# Patient Record
Sex: MALE | URBAN - METROPOLITAN AREA
[De-identification: names, ages, dates, MRNs, and addresses within clinical notes are randomized per-mention and may not be internally consistent; named-entity substitution may affect disease eponyms.]

---

## 2017-04-10 ENCOUNTER — TELEPHONE (OUTPATIENT)
Dept: SPORTS MEDICINE | Facility: CLINIC | Age: 37
End: 2017-04-10

## 2017-04-10 NOTE — TELEPHONE ENCOUNTER
----- Message from Ponce Mayes sent at 4/10/2017  9:36 AM CDT -----  Contact: self  Pt injured his rt knee and would like to be seen today. Mr. Mobley can be reached at [phone].

## 2017-04-11 ENCOUNTER — OFFICE VISIT (OUTPATIENT)
Dept: INTERNAL MEDICINE | Facility: CLINIC | Age: 37
End: 2017-04-11
Payer: COMMERCIAL

## 2017-04-11 VITALS
WEIGHT: 201.94 LBS | OXYGEN SATURATION: 98 % | SYSTOLIC BLOOD PRESSURE: 118 MMHG | BODY MASS INDEX: 29.91 KG/M2 | HEART RATE: 65 BPM | HEIGHT: 69 IN | DIASTOLIC BLOOD PRESSURE: 62 MMHG

## 2017-04-11 DIAGNOSIS — M22.2X1 PATELLOFEMORAL PAIN SYNDROME OF RIGHT KNEE: Primary | ICD-10-CM

## 2017-04-11 PROCEDURE — 99999 PR PBB SHADOW E&M-EST. PATIENT-LVL III: CPT | Mod: PBBFAC,,, | Performed by: FAMILY MEDICINE

## 2017-04-11 PROCEDURE — 1160F RVW MEDS BY RX/DR IN RCRD: CPT | Mod: S$GLB,,, | Performed by: FAMILY MEDICINE

## 2017-04-11 PROCEDURE — 99203 OFFICE O/P NEW LOW 30 MIN: CPT | Mod: S$GLB,,, | Performed by: FAMILY MEDICINE

## 2017-04-11 RX ORDER — IBUPROFEN 200 MG
200 TABLET ORAL EVERY 6 HOURS PRN
COMMUNITY
End: 2018-03-06

## 2017-04-11 NOTE — MR AVS SNAPSHOT
"    [facility]  [address]  [facility] [address]  Phone: [phone]  Fax: [phone]                  Urbano Paz   2017 3:40 PM   Office Visit    Description:  Male : 1980   Provider:  Oli Jerome MD   Department:  [facility]           Reason for Visit     Knee Pain           Diagnoses this Visit        Comments    Patellofemoral pain syndrome of right knee    -  Primary            To Do List           Goals (5 Years of Data)     None      Follow-Up and Disposition     Return for Call or message through Portal for status update..      [facility]     [facility] On Call Nurse Care Line -  Assistance  Unless otherwise directed by your provider, please contact [facility], our nurse care line that is available for  assistance.     Registered nurses in the [facility] On Call Center provide: appointment scheduling, clinical advisement, health education, and other advisory services.  Call: [phone] (toll free)               Medications           Message regarding Medications     Verify the changes and/or additions to your medication regime listed below are the same as discussed with your clinician today.  If any of these changes or additions are incorrect, please notify your healthcare provider.             Verify that the below list of medications is an accurate representation of the medications you are currently taking.  If none reported, the list may be blank. If incorrect, please contact your healthcare provider. Carry this list with you in case of emergency.           Current Medications     ibuprofen (ADVIL,MOTRIN) 200 MG tablet Take 200 mg by mouth every 6 (six) hours as needed for Pain.           Clinical Reference Information           Your Vitals Were     BP Pulse Height Weight SpO2 BMI    118/62 (BP Location: Left arm, Patient Position: Sitting, BP Method: Manual) 65 5' 9" (1.753 m) 91.6 kg (201 lb 15.1 oz) " 98% 29.82 kg/m2      Blood Pressure          Most Recent Value    BP  118/62      Allergies as of 4/11/2017     No Known Allergies      Immunizations Administered on Date of Encounter - 4/11/2017     None      [facility] Sign-Up     Activating your [facility] account is as easy as 1-2-3!     1) Visit [email or web address], select Sign Up Now, enter this activation code and your date of birth, then select Next.  [email or web address]  Expires: 5/26/2017  3:58 PM      2) Create a username and password to use when you visit [facility] in the future and select a security question in case you lose your password and select Next.    3) Enter your e-mail address and click Sign Up!    Additional Information  If you have questions, please e-mail [email or web address] or call [phone] to talk to our [facility] staff. Remember, [facility] is NOT to be used for urgent needs. For medical emergencies, dial 911.         Language Assistance Services     ATTENTION: Language assistance services are available, free of charge. Please call [phone].      ATENCIÓN: Si habla español, tiene a bojorquez disposición servicios gratuitos de asistencia lingüística. Llame al [phone].     CHARLEY Ý: N?u b?n nói Ti?ng Vi?t, có các d?ch v? h? tr? ngôn ng? mi?n phí dành cho b?n. G?i s? [phone].         [facility] complies with applicable Federal civil rights laws and does not discriminate on the basis of race, color, national origin, age, disability, or sex.

## 2017-04-11 NOTE — PROGRESS NOTES
Subjective:       Patient ID: Urbano Paz is a 36 y.o. male.    Chief Complaint: Knee Pain (Right knee x Sunday )    HPI  Review of Systems   Constitutional: Negative for chills, fatigue and fever.   HENT: Negative for congestion and trouble swallowing.    Eyes: Negative for redness.   Respiratory: Negative for cough, chest tightness and shortness of breath.    Cardiovascular: Negative for chest pain, palpitations and leg swelling.   Gastrointestinal: Negative for abdominal pain and blood in stool.   Genitourinary: Negative for hematuria.   Musculoskeletal: Negative for arthralgias, back pain, gait problem, joint swelling, myalgias and neck pain.   Skin: Negative for color change and rash.   Neurological: Negative for tremors, speech difficulty, weakness, numbness and headaches.   Hematological: Negative for adenopathy. Does not bruise/bleed easily.   Psychiatric/Behavioral: Negative for behavioral problems, confusion and sleep disturbance. The patient is not nervous/anxious.        Objective:      Physical Exam   Constitutional: He is oriented to person, place, and time. He appears well-developed and well-nourished. No distress.   Neck: Neck supple.   Pulmonary/Chest: Effort normal.   Musculoskeletal: He exhibits no edema.        Right knee: He exhibits normal range of motion, no swelling, no effusion, no ecchymosis, no deformity, no laceration, no erythema, normal alignment, no LCL laxity, normal patellar mobility, no bony tenderness, normal meniscus and no MCL laxity. Tenderness found. No medial joint line, no lateral joint line, no MCL, no LCL and no patellar tendon tenderness noted.        Left knee: He exhibits normal range of motion, no swelling, no effusion, no ecchymosis, no deformity, no laceration, no erythema, normal alignment, no LCL laxity, normal patellar mobility, no bony tenderness, normal meniscus and no MCL laxity. No tenderness found. No medial joint line, no lateral joint line, no  MCL, no LCL and no patellar tendon tenderness noted.        Right lower leg: He exhibits no edema.        Left lower leg: He exhibits no edema.   Neurological: He is alert and oriented to person, place, and time. He has normal strength. No sensory deficit. Coordination and gait normal.   Skin: Skin is warm and dry. No rash noted.   Psychiatric: He has a normal mood and affect. His behavior is normal. Judgment and thought content normal.   Nursing note and vitals reviewed.      Assessment:       1. Patellofemoral pain syndrome of right knee        Plan:   Urbano was seen today for knee pain.    Diagnoses and all orders for this visit:    Patellofemoral pain syndrome of right knee      See meds, orders, follow up, routing and instructions sections of encounter.  HISTORY OF PRESENT ILLNESS:  This is a gentleman with a right knee injury,   unspecified from doing martial arts a few days ago.  There was no specific   injury.  His pain initiated essentially after a workout.  It is located to the   lower lateral patellar facet on the right at the juncture of the patellar   tendon.    He has an essentially normal knee examination, but did have difficulty   descending the step with an eccentric contraction on the ipsilateral side.    Suggested stretching, Aleve, and a followup p.r.n. for nonresolution or   worsening.  He has a karate tournament next week.  I asked him to do progressive   activities as a test through the week and notify me for significant problems.      DEMETRIO/LISSETH  dd: 04/11/2017 17:19:41 (CDT)  td: 04/12/2017 14:32:41 (CDT)  Doc ID   #[id number]  Job ID #[id number]    CC:

## 2018-03-06 ENCOUNTER — HOSPITAL ENCOUNTER (EMERGENCY)
Facility: HOSPITAL | Age: 38
Discharge: HOME | End: 2018-03-06
Attending: EMERGENCY MEDICINE
Payer: COMMERCIAL

## 2018-03-06 ENCOUNTER — OFFICE VISIT (OUTPATIENT)
Dept: INTERNAL MEDICINE | Facility: CLINIC | Age: 38
End: 2018-03-06
Payer: COMMERCIAL

## 2018-03-06 VITALS
SYSTOLIC BLOOD PRESSURE: 118 MMHG | HEIGHT: 69 IN | WEIGHT: 200.81 LBS | HEART RATE: 50 BPM | DIASTOLIC BLOOD PRESSURE: 72 MMHG | OXYGEN SATURATION: 98 % | BODY MASS INDEX: 29.74 KG/M2

## 2018-03-06 VITALS
DIASTOLIC BLOOD PRESSURE: 59 MMHG | BODY MASS INDEX: 28.88 KG/M2 | TEMPERATURE: 99 F | OXYGEN SATURATION: 98 % | HEART RATE: 50 BPM | HEIGHT: 69 IN | WEIGHT: 195 LBS | SYSTOLIC BLOOD PRESSURE: 114 MMHG | RESPIRATION RATE: 10 BRPM

## 2018-03-06 DIAGNOSIS — R07.9 CHEST PAIN, UNSPECIFIED TYPE: Primary | ICD-10-CM

## 2018-03-06 DIAGNOSIS — R07.9 CHEST PAIN: ICD-10-CM

## 2018-03-06 DIAGNOSIS — G80.9 CP (CEREBRAL PALSY): ICD-10-CM

## 2018-03-06 LAB
ALBUMIN SERPL BCP-MCNC: 4.5 G/DL
ALP SERPL-CCNC: 54 U/L
ALT SERPL W/O P-5'-P-CCNC: 44 U/L
ANION GAP SERPL CALC-SCNC: 9 MMOL/L
AST SERPL-CCNC: 25 U/L
BASOPHILS # BLD AUTO: 0.02 K/UL
BASOPHILS NFR BLD: 0.5 %
BILIRUB SERPL-MCNC: 0.7 MG/DL
BNP SERPL-MCNC: 29 PG/ML
BUN SERPL-MCNC: 15 MG/DL
CALCIUM SERPL-MCNC: 9.7 MG/DL
CHLORIDE SERPL-SCNC: 105 MMOL/L
CO2 SERPL-SCNC: 26 MMOL/L
CREAT SERPL-MCNC: 1.2 MG/DL
DIFFERENTIAL METHOD: ABNORMAL
EOSINOPHIL # BLD AUTO: 0.1 K/UL
EOSINOPHIL NFR BLD: 1.5 %
ERYTHROCYTE [DISTWIDTH] IN BLOOD BY AUTOMATED COUNT: 13.7 %
EST. GFR  (AFRICAN AMERICAN): >60 ML/MIN/1.73 M^2
EST. GFR  (NON AFRICAN AMERICAN): >60 ML/MIN/1.73 M^2
GLUCOSE SERPL-MCNC: 103 MG/DL
HCT VFR BLD AUTO: 38 %
HGB BLD-MCNC: 12.4 G/DL
IMM GRANULOCYTES # BLD AUTO: 0.01 K/UL
IMM GRANULOCYTES NFR BLD AUTO: 0.3 %
LYMPHOCYTES # BLD AUTO: 1.7 K/UL
LYMPHOCYTES NFR BLD: 43.7 %
MCH RBC QN AUTO: 27.4 PG
MCHC RBC AUTO-ENTMCNC: 32.6 G/DL
MCV RBC AUTO: 84 FL
MONOCYTES # BLD AUTO: 0.3 K/UL
MONOCYTES NFR BLD: 8.1 %
NEUTROPHILS # BLD AUTO: 1.8 K/UL
NEUTROPHILS NFR BLD: 45.9 %
NRBC BLD-RTO: 0 /100 WBC
PLATELET # BLD AUTO: 196 K/UL
PMV BLD AUTO: 12.1 FL
POTASSIUM SERPL-SCNC: 4.1 MMOL/L
PROT SERPL-MCNC: 7.4 G/DL
RBC # BLD AUTO: 4.53 M/UL
SODIUM SERPL-SCNC: 140 MMOL/L
TROPONIN I SERPL DL<=0.01 NG/ML-MCNC: <0.006 NG/ML
WBC # BLD AUTO: 3.94 K/UL

## 2018-03-06 PROCEDURE — 93005 ELECTROCARDIOGRAM TRACING: CPT

## 2018-03-06 PROCEDURE — 93010 ELECTROCARDIOGRAM REPORT: CPT | Mod: S$GLB,,, | Performed by: INTERNAL MEDICINE

## 2018-03-06 PROCEDURE — 99284 EMERGENCY DEPT VISIT MOD MDM: CPT | Mod: 25

## 2018-03-06 PROCEDURE — 85025 COMPLETE CBC W/AUTO DIFF WBC: CPT

## 2018-03-06 PROCEDURE — 93010 ELECTROCARDIOGRAM REPORT: CPT | Mod: ,,, | Performed by: INTERNAL MEDICINE

## 2018-03-06 PROCEDURE — 25000003 PHARM REV CODE 250: Performed by: EMERGENCY MEDICINE

## 2018-03-06 PROCEDURE — 99213 OFFICE O/P EST LOW 20 MIN: CPT | Mod: S$GLB,,, | Performed by: PHYSICIAN ASSISTANT

## 2018-03-06 PROCEDURE — 99999 PR PBB SHADOW E&M-EST. PATIENT-LVL III: CPT | Mod: PBBFAC,,, | Performed by: PHYSICIAN ASSISTANT

## 2018-03-06 PROCEDURE — 83880 ASSAY OF NATRIURETIC PEPTIDE: CPT

## 2018-03-06 PROCEDURE — 84484 ASSAY OF TROPONIN QUANT: CPT

## 2018-03-06 PROCEDURE — 93005 ELECTROCARDIOGRAM TRACING: CPT | Mod: S$GLB,,, | Performed by: PHYSICIAN ASSISTANT

## 2018-03-06 PROCEDURE — 99285 EMERGENCY DEPT VISIT HI MDM: CPT | Mod: ,,, | Performed by: EMERGENCY MEDICINE

## 2018-03-06 PROCEDURE — 80053 COMPREHEN METABOLIC PANEL: CPT

## 2018-03-06 RX ORDER — ASPIRIN 325 MG
325 TABLET ORAL
Status: COMPLETED | OUTPATIENT
Start: 2018-03-06 | End: 2018-03-06

## 2018-03-06 RX ORDER — NAPROXEN SODIUM 220 MG/1
324 TABLET, FILM COATED ORAL
Status: DISCONTINUED | OUTPATIENT
Start: 2018-03-06 | End: 2018-03-06

## 2018-03-06 RX ORDER — KETOROLAC TROMETHAMINE 30 MG/ML
30 INJECTION, SOLUTION INTRAMUSCULAR; INTRAVENOUS
Status: DISCONTINUED | OUTPATIENT
Start: 2018-03-06 | End: 2018-03-06 | Stop reason: HOSPADM

## 2018-03-06 RX ORDER — IBUPROFEN 800 MG/1
800 TABLET ORAL EVERY 6 HOURS PRN
Qty: 20 TABLET | Refills: 0 | Status: SHIPPED | OUTPATIENT
Start: 2018-03-06 | End: 2020-06-17

## 2018-03-06 RX ADMIN — ASPIRIN 325 MG ORAL TABLET 325 MG: 325 PILL ORAL at 01:03

## 2018-03-06 NOTE — ED NOTES
Patient refused 2nd troponin draw and pain medication.  Dr. Reed notified.  Patient will be discharged

## 2018-03-06 NOTE — PROGRESS NOTES
Subjective:       Patient ID: Urbano Paz is a 37 y.o. male.        Chief Complaint: Chest Pain    Urbano Paz is an established patient of [facility] here today for urgent care visit.    Substernal chest pain radiating down left arm with associated with left arm tingling/numbness.  Initially had chest pain last night.  Woke up from sleeping with a pounding feeling in his chest and associated shortness of breath.  He changed position to lay on his back and his wife gave him an antacid.  The pounding and shortness of breath improved after 5-10 minutes.  He went back to sleep.    This morning, while walking into school and carrying a back pain and other items in his arms, he began to have chest pain again.  The pain began to radiate down his left arm and with associated tingling/numbness.  He feels fatigued.    He has not had a physical in a while.             Review of Systems   Constitutional: Positive for fatigue. Negative for appetite change, chills and fever.   HENT: Negative for congestion and sore throat.    Eyes: Negative for visual disturbance.   Respiratory: Negative for cough, chest tightness and shortness of breath.    Cardiovascular: Positive for chest pain. Negative for palpitations and leg swelling.   Gastrointestinal: Negative for abdominal pain, blood in stool, constipation, diarrhea, nausea and vomiting.   Genitourinary: Negative for dysuria, frequency, hematuria and urgency.   Musculoskeletal: Negative for arthralgias and back pain.   Skin: Negative for rash.   Neurological: Positive for numbness. Negative for dizziness, syncope, weakness and headaches.   Psychiatric/Behavioral: Negative for dysphoric mood and sleep disturbance. The patient is not nervous/anxious.        Objective:      Physical Exam   Constitutional: He appears well-developed and well-nourished. No distress.   HENT:   Head: Normocephalic and atraumatic.   Right Ear: Tympanic membrane, external ear and  "ear canal normal.   Left Ear: Tympanic membrane, external ear and ear canal normal.   Nose: Nose normal.   Mouth/Throat: Oropharynx is clear and moist.   Eyes: Conjunctivae are normal. Pupils are equal, round, and reactive to light.   Cardiovascular: Normal rate, regular rhythm and normal heart sounds.  Exam reveals no gallop and no friction rub.    No murmur heard.  Pulmonary/Chest: Effort normal and breath sounds normal. No respiratory distress.   Abdominal: Soft. There is no tenderness. There is no rebound and no guarding.   Musculoskeletal: He exhibits no edema.   Neurological: He is alert.   Skin: Skin is warm and dry. He is not diaphoretic.   Psychiatric: He has a normal mood and affect.   Nursing note and vitals reviewed.      Assessment:       1. Chest pain, unspecified type        Plan:       Urbano was seen today for chest pain.    Diagnoses and all orders for this visit:    Chest pain, unspecified type  -     EKG 12-lead  -     Refer to Emergency Dept.    Patient discussed with Dr. Tucker today in detail.  Exertional chest pain radiating down left arm with associated numbness/tingling.  Needs further eval in ED.  His wife will transport him to the ED.     Pt has been given instructions populated from [employer] database and has verbalized understanding of the after visit summary and information contained wherein.    Follow up with a primary care provider. May go to ER for acute shortness of breath, lightheadedness, fever, or any other emergent complaints or changes in condition.    "This note will be shared with the patient"    No future appointments.            "

## 2018-03-06 NOTE — ED NOTES
Side rails up x 2, call bell in reach, family member at bedside.  Patient denies any other needs at this time.  Will continue to monitor.

## 2018-03-06 NOTE — ED PROVIDER NOTES
Encounter Date: 3/6/2018    SCRIBE #1 NOTE: I, Billie Feliciano, am scribing for, and in the presence of,  Dr. Reed . I have scribed the entire note.       History     Chief Complaint   Patient presents with    Chest Pain     sent im clinic, chest pain at 3 am, denies cardiac hx     Time patient was seen by the provider: 3:29 PM    The patient is a 37 y.o. male with no co-morbidities who presents to the ED with a complaint of chest pain with associated SOB. At 3:00AM pt woke up with substernal chest pressure for approximately 10 minutes with associated SOB. The symptoms resolved. He did not experience any other episodes of chest pain,but states while he was carrying boxes, he experienced left shoulder numbness and tingling. He denies chest pain, and SOB currently. He also denies recent travel, fever, chills, and cough.        The history is provided by the patient.     Review of patient's allergies indicates:  No Known Allergies  History reviewed. No pertinent past medical history.  Past Surgical History:   Procedure Laterality Date    ADENOIDECTOMY       History reviewed. No pertinent family history.  Social History   Substance Use Topics    Smoking status: Former Smoker    Smokeless tobacco: Not on file    Alcohol use Yes      Comment: socially     Review of Systems   Constitutional: Negative for chills and fever.   HENT: Negative for sore throat.    Respiratory: Positive for shortness of breath (Resolved ). Negative for cough.    Cardiovascular: Positive for chest pain (Resolved).   Gastrointestinal: Negative for nausea.   Genitourinary: Negative for dysuria.   Musculoskeletal: Negative for back pain.   Skin: Negative for rash.   Neurological: Positive for numbness (Left shoulder ). Negative for weakness.        Positive for tingling in left shoulder.    Hematological: Does not bruise/bleed easily.       Physical Exam     Initial Vitals [03/06/18 1302]   BP Pulse Resp Temp SpO2   (!) 119/59 (!) 53 16 98.5 °F  (36.9 °C) 98 %      MAP       79         Physical Exam    Nursing note and vitals reviewed.  Constitutional: He appears well-developed and well-nourished.   HENT:   Head: Normocephalic and atraumatic.   Eyes: Conjunctivae and EOM are normal. Pupils are equal, round, and reactive to light.   Neck: Normal range of motion. Neck supple. No tracheal deviation present.   Cardiovascular: Normal heart sounds and intact distal pulses.   Mild Bradycardia.    Pulmonary/Chest: Breath sounds normal. No respiratory distress. He has no wheezes. He has no rhonchi. He has no rales. He exhibits no tenderness.   Abdominal: Soft. Bowel sounds are normal. He exhibits no distension and no mass. There is no tenderness. There is no rebound and no guarding.   Musculoskeletal: Normal range of motion. He exhibits no edema.   Neurological: He is alert and oriented to person, place, and time. He has normal strength. No sensory deficit.   Skin: Skin is warm and dry. Capillary refill takes less than 2 seconds.   Psychiatric: He has a normal mood and affect. His behavior is normal. Judgment and thought content normal.           ED Course   Procedures  Labs Reviewed   CBC W/ AUTO DIFFERENTIAL - Abnormal; Notable for the following:        Result Value    RBC 4.53 (*)     Hemoglobin 12.4 (*)     Hematocrit 38.0 (*)     All other components within normal limits   COMPREHENSIVE METABOLIC PANEL - Abnormal; Notable for the following:     Alkaline Phosphatase 54 (*)     All other components within normal limits   TROPONIN I   B-TYPE NATRIURETIC PEPTIDE   TROPONIN I        Imaging Results          X-Ray Chest AP Portable (Final result)  Result time 03/06/18 14:48:32    Final result by Herman Doss MD (03/06/18 14:48:32)                 Impression:      No acute cardiopulmonary process.        Electronically signed by: HERMAN DOSS MD  Date:     03/06/18  Time:    14:48              Narrative:    Chest AP portable    Indication:Cerebral  palsy    Comparison:None    Findings:  The cardiomediastinal silhouette is not enlarged.  There is no pleural effusion.  The trachea is midline.  The lungs are symmetrically expanded bilaterally without evidence of acute parenchymal process. No large focal consolidation seen.  There is no pneumothorax.  The osseous structures are unremarkable.                                 Medical Decision Making:   History:   Old Medical Records: I decided to obtain old medical records.  Clinical Tests:   Lab Tests: Ordered and Reviewed  Radiological Study: Ordered and Reviewed  Medical Tests: Ordered and Reviewed  ED Management:  The patient's EKG showed no signs of ischemia, but did show bradycardia.  The patient admits that he is always had a slow heart rate.  The patient's chest x-ray was negative for any acute pathology, neurological grossly within normal limits.  Specifically the patient's troponin was negative, making the patient's incident of chest discomfort 2 AM very unlikely secondary to myocardial ischemia.  I did offer to repeat a second set of cardiac enzymes, and also offered observation admission the patient, they stated that he would prefer to be discharged home, but also stated that he would be following up with cardiology for further assessment and evaluation.  The patient verbalizes understanding of this plan and agreed.  Stated he would return immediately for any recurrent episodes.            Scribe Attestation:   Scribe #1: I performed the above scribed service and the documentation accurately describes the services I performed. I attest to the accuracy of the note.    I, Dr. Pelon Reed, personally performed the services described in this documentation. All medical record entries made by the scribe were at my direction and in my presence.  I have reviewed the chart and agree that the record reflects my personal performance and is accurate and complete.  Pelon Reed MD.  3:49 PM 03/06/2018               Clinical  Impression:   The primary encounter diagnosis was Chest pain, unspecified type. Diagnoses of Chest pain and CP (cerebral palsy) were also pertinent to this visit.                           Massimo Reed MD  03/06/18 2445

## 2018-03-06 NOTE — ED NOTES
"Chest pain that started around 0300 this morning that woke him from his sleep.  Describes pain as "heart racing and strong/dull pain".  States pain was right in center of his chest.  reports SOB with the pain and nausea.  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   "

## 2018-06-28 ENCOUNTER — OFFICE VISIT (OUTPATIENT)
Dept: INTERNAL MEDICINE | Facility: CLINIC | Age: 38
End: 2018-06-28
Attending: FAMILY MEDICINE
Payer: COMMERCIAL

## 2018-06-28 ENCOUNTER — HOSPITAL ENCOUNTER (OUTPATIENT)
Dept: RADIOLOGY | Facility: HOSPITAL | Age: 38
Discharge: HOME | End: 2018-06-28
Attending: FAMILY MEDICINE
Payer: COMMERCIAL

## 2018-06-28 VITALS
DIASTOLIC BLOOD PRESSURE: 60 MMHG | WEIGHT: 199.31 LBS | SYSTOLIC BLOOD PRESSURE: 100 MMHG | HEIGHT: 69 IN | HEART RATE: 56 BPM | BODY MASS INDEX: 29.52 KG/M2

## 2018-06-28 DIAGNOSIS — R10.31 RIGHT INGUINAL PAIN: ICD-10-CM

## 2018-06-28 DIAGNOSIS — M22.2X1 PATELLOFEMORAL PAIN SYNDROME OF RIGHT KNEE: ICD-10-CM

## 2018-06-28 DIAGNOSIS — Z00.00 ANNUAL PHYSICAL EXAM: ICD-10-CM

## 2018-06-28 DIAGNOSIS — R07.9 CHEST PAIN, UNSPECIFIED TYPE: ICD-10-CM

## 2018-06-28 DIAGNOSIS — M22.2X1 PATELLOFEMORAL PAIN SYNDROME OF RIGHT KNEE: Primary | ICD-10-CM

## 2018-06-28 PROCEDURE — 73564 X-RAY EXAM KNEE 4 OR MORE: CPT | Mod: 26,RT,, | Performed by: RADIOLOGY

## 2018-06-28 PROCEDURE — 73521 X-RAY EXAM HIPS BI 2 VIEWS: CPT | Mod: 26,,, | Performed by: RADIOLOGY

## 2018-06-28 PROCEDURE — 73521 X-RAY EXAM HIPS BI 2 VIEWS: CPT | Mod: TC

## 2018-06-28 PROCEDURE — 73562 X-RAY EXAM OF KNEE 3: CPT | Mod: 26,XS,LT, | Performed by: RADIOLOGY

## 2018-06-28 PROCEDURE — 73562 X-RAY EXAM OF KNEE 3: CPT | Mod: TC,LT

## 2018-06-28 PROCEDURE — 99214 OFFICE O/P EST MOD 30 MIN: CPT | Mod: S$GLB,,, | Performed by: FAMILY MEDICINE

## 2018-06-28 PROCEDURE — 99999 PR PBB SHADOW E&M-EST. PATIENT-LVL IV: CPT | Mod: PBBFAC,,, | Performed by: FAMILY MEDICINE

## 2018-06-28 PROCEDURE — 3008F BODY MASS INDEX DOCD: CPT | Mod: CPTII,S$GLB,, | Performed by: FAMILY MEDICINE
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
## 2018-07-03 ENCOUNTER — TELEPHONE (OUTPATIENT)
Dept: INTERNAL MEDICINE | Facility: CLINIC | Age: 38
End: 2018-07-03

## 2019-07-29 ENCOUNTER — LAB VISIT (OUTPATIENT)
Dept: LAB | Facility: HOSPITAL | Age: 39
End: 2019-07-29
Attending: INTERNAL MEDICINE
Payer: COMMERCIAL

## 2019-07-29 ENCOUNTER — OFFICE VISIT (OUTPATIENT)
Dept: INTERNAL MEDICINE | Facility: CLINIC | Age: 39
End: 2019-07-29
Payer: COMMERCIAL

## 2019-07-29 VITALS
HEIGHT: 68 IN | BODY MASS INDEX: 29.07 KG/M2 | OXYGEN SATURATION: 58 % | DIASTOLIC BLOOD PRESSURE: 72 MMHG | SYSTOLIC BLOOD PRESSURE: 106 MMHG | HEART RATE: 70 BPM | WEIGHT: 191.81 LBS

## 2019-07-29 DIAGNOSIS — D64.9 ANEMIA, UNSPECIFIED TYPE: ICD-10-CM

## 2019-07-29 DIAGNOSIS — R10.13 EPIGASTRIC PAIN: Primary | ICD-10-CM

## 2019-07-29 DIAGNOSIS — R10.13 EPIGASTRIC PAIN: ICD-10-CM

## 2019-07-29 LAB
ALBUMIN SERPL BCP-MCNC: 4.2 G/DL (ref 3.5–5.2)
ALP SERPL-CCNC: 41 U/L (ref 55–135)
ALT SERPL W/O P-5'-P-CCNC: 48 U/L (ref 10–44)
ANION GAP SERPL CALC-SCNC: 8 MMOL/L (ref 8–16)
AST SERPL-CCNC: 23 U/L (ref 10–40)
BILIRUB SERPL-MCNC: 0.2 MG/DL (ref 0.1–1)
BUN SERPL-MCNC: 16 MG/DL (ref 6–20)
CALCIUM SERPL-MCNC: 9.4 MG/DL (ref 8.7–10.5)
CHLORIDE SERPL-SCNC: 104 MMOL/L (ref 95–110)
CO2 SERPL-SCNC: 30 MMOL/L (ref 23–29)
CREAT SERPL-MCNC: 1.1 MG/DL (ref 0.5–1.4)
DIFFERENTIAL METHOD: ABNORMAL
ERYTHROCYTE [DISTWIDTH] IN BLOOD BY AUTOMATED COUNT: 13.8 % (ref 11.5–14.5)
EST. GFR  (AFRICAN AMERICAN): >60 ML/MIN/1.73 M^2
EST. GFR  (NON AFRICAN AMERICAN): >60 ML/MIN/1.73 M^2
GLUCOSE SERPL-MCNC: 95 MG/DL (ref 70–110)
HCT VFR BLD AUTO: 39.6 % (ref 40–54)
HGB BLD-MCNC: 12.8 G/DL (ref 14–18)
MCH RBC QN AUTO: 27.9 PG (ref 27–31)
MCHC RBC AUTO-ENTMCNC: 32.3 G/DL (ref 32–36)
MCV RBC AUTO: 87 FL (ref 82–98)
PLATELET # BLD AUTO: 232 K/UL (ref 150–350)
PMV BLD AUTO: 11.5 FL (ref 9.2–12.9)
POTASSIUM SERPL-SCNC: 4 MMOL/L (ref 3.5–5.1)
PROT SERPL-MCNC: 7.1 G/DL (ref 6–8.4)
RBC # BLD AUTO: 4.58 M/UL (ref 4.6–6.2)
SODIUM SERPL-SCNC: 142 MMOL/L (ref 136–145)
WBC # BLD AUTO: 4.59 K/UL (ref 3.9–12.7)

## 2019-07-29 PROCEDURE — 82728 ASSAY OF FERRITIN: CPT

## 2019-07-29 PROCEDURE — 99999 PR PBB SHADOW E&M-EST. PATIENT-LVL III: ICD-10-PCS | Mod: PBBFAC,,, | Performed by: INTERNAL MEDICINE

## 2019-07-29 PROCEDURE — 83020 HEMOGLOBIN ELECTROPHORESIS: CPT

## 2019-07-29 PROCEDURE — 99214 OFFICE O/P EST MOD 30 MIN: CPT | Mod: S$GLB,,, | Performed by: INTERNAL MEDICINE

## 2019-07-29 PROCEDURE — 3008F PR BODY MASS INDEX (BMI) DOCUMENTED: ICD-10-PCS | Mod: CPTII,S$GLB,, | Performed by: INTERNAL MEDICINE

## 2019-07-29 PROCEDURE — 99999 PR PBB SHADOW E&M-EST. PATIENT-LVL III: CPT | Mod: PBBFAC,,, | Performed by: INTERNAL MEDICINE

## 2019-07-29 PROCEDURE — 86677 HELICOBACTER PYLORI ANTIBODY: CPT

## 2019-07-29 PROCEDURE — 3008F BODY MASS INDEX DOCD: CPT | Mod: CPTII,S$GLB,, | Performed by: INTERNAL MEDICINE

## 2019-07-29 PROCEDURE — 83540 ASSAY OF IRON: CPT

## 2019-07-29 PROCEDURE — 36415 COLL VENOUS BLD VENIPUNCTURE: CPT

## 2019-07-29 PROCEDURE — 99214 PR OFFICE/OUTPT VISIT, EST, LEVL IV, 30-39 MIN: ICD-10-PCS | Mod: S$GLB,,, | Performed by: INTERNAL MEDICINE

## 2019-07-29 PROCEDURE — 80053 COMPREHEN METABOLIC PANEL: CPT

## 2019-07-29 NOTE — PROGRESS NOTES
Subjective:       Patient ID: Urbano Art is a 38 y.o. male.    Chief Complaint: Follow-up    HPI   It [place] 3 weeks. Returned 7/19.  [place] gasy on way home.  Tuesday last week, sweats, upper abd pain chills.  Felt cold and hot.  Didn't take temp.    Better after a couple of days.  Feels better now - but still doesn' t feel right. Burning in upper abd, now dull discomfort.    Recalls eating poorly cooked meat and wondes if he might have tapeworms.   C/o nausea.  No vomiting.  Dizzy in waves .    Fatigued.  Now eating normally.  But sometimes can't finish meals.  Upper abd pain may wax and wane in intensity.  Sleeps normally at night.  Lost a couple of lbs initially, stable since.      Last labs reviewed. NCNC anemia.  Review of Systems   Constitutional: Negative for activity change and unexpected weight change.   Respiratory: Negative for chest tightness and shortness of breath.    Cardiovascular: Negative for chest pain and leg swelling.   Gastrointestinal: Positive for abdominal pain and nausea.   Neurological: Negative for headaches.   Psychiatric/Behavioral: Negative for dysphoric mood.       Objective:      Physical Exam   Constitutional: He is oriented to person, place, and time. He appears well-developed and well-nourished.   Eyes: No scleral icterus.   Neck: No JVD present. No thyromegaly present.   Cardiovascular: Normal rate, regular rhythm and normal heart sounds.   Pulmonary/Chest: Effort normal and breath sounds normal. No respiratory distress. He has no wheezes. He has no rales.   Abdominal: Soft. He exhibits no mass. There is no tenderness.   Musculoskeletal: He exhibits no edema.   Neurological: He is alert and oriented to person, place, and time.   Psychiatric: He has a normal mood and affect. His behavior is normal.       Assessment:       1. Epigastric pain - improving.  Gastritis vs other.   2. Anemia, unspecified type        Plan:       Urbano was seen today for  follow-up.    Diagnoses and all orders for this visit:    Epigastric pain  -     CBC auto differential; Future  -     Comprehensive metabolic panel; Future  -     Hemoglobin Electrophoresis [place], Blood; Future  -     H.Pylori Antibody IgG; Future    Anemia, unspecified type  -     Ferritin; Future  -     Iron and TIBC; Future       Reassurance      Zantac      He declines med for nausea      Call if im provement doesn't continue.

## 2019-07-30 LAB
FERRITIN SERPL-MCNC: 108 NG/ML (ref 20–300)
IRON SERPL-MCNC: 83 UG/DL (ref 45–160)
SATURATED IRON: 25 % (ref 20–50)
TOTAL IRON BINDING CAPACITY: 326 UG/DL (ref 250–450)
TRANSFERRIN SERPL-MCNC: 220 MG/DL (ref 200–375)

## 2019-07-31 LAB
HGB A MFR BLD ELPH: 97.5 % (ref 95.8–98)
HGB A2 MFR BLD: 2.5 % (ref 2–3.3)
HGB F MFR BLD: 0 % (ref 0–0.9)
HGB FRACT BLD ELPH-IMP: NORMAL
HGB OTHER MFR BLD ELPH: 0 %
THEVP VARIANT 2: NORMAL
THEVP VARIANT 3: NORMAL

## 2019-08-01 LAB — H PYLORI IGG SERPL QL IA: NEGATIVE

## 2019-08-06 DIAGNOSIS — R74.01 ELEVATED ALT MEASUREMENT: Primary | ICD-10-CM

## 2020-06-17 ENCOUNTER — OFFICE VISIT (OUTPATIENT)
Dept: INTERNAL MEDICINE | Facility: CLINIC | Age: 40
End: 2020-06-17
Payer: COMMERCIAL

## 2020-06-17 VITALS
DIASTOLIC BLOOD PRESSURE: 64 MMHG | HEART RATE: 49 BPM | OXYGEN SATURATION: 97 % | BODY MASS INDEX: 30.61 KG/M2 | SYSTOLIC BLOOD PRESSURE: 110 MMHG | WEIGHT: 201.94 LBS | HEIGHT: 68 IN | TEMPERATURE: 99 F

## 2020-06-17 DIAGNOSIS — H61.23 IMPACTED CERUMEN OF BOTH EARS: Primary | ICD-10-CM

## 2020-06-17 PROCEDURE — 3008F PR BODY MASS INDEX (BMI) DOCUMENTED: ICD-10-PCS | Mod: CPTII,S$GLB,, | Performed by: INTERNAL MEDICINE

## 2020-06-17 PROCEDURE — 99213 OFFICE O/P EST LOW 20 MIN: CPT | Mod: S$GLB,,, | Performed by: INTERNAL MEDICINE

## 2020-06-17 PROCEDURE — 99999 PR PBB SHADOW E&M-EST. PATIENT-LVL III: CPT | Mod: PBBFAC,,, | Performed by: INTERNAL MEDICINE

## 2020-06-17 PROCEDURE — 3008F BODY MASS INDEX DOCD: CPT | Mod: CPTII,S$GLB,, | Performed by: INTERNAL MEDICINE

## 2020-06-17 PROCEDURE — 99213 PR OFFICE/OUTPT VISIT, EST, LEVL III, 20-29 MIN: ICD-10-PCS | Mod: S$GLB,,, | Performed by: INTERNAL MEDICINE

## 2020-06-17 PROCEDURE — 99999 PR PBB SHADOW E&M-EST. PATIENT-LVL III: ICD-10-PCS | Mod: PBBFAC,,, | Performed by: INTERNAL MEDICINE

## 2020-06-17 NOTE — PROGRESS NOTES
"  INTERNAL MEDICINE SAME DAY PRIMARY CARE VISIT NOTE    Subjective:     Chief Complaint: Ear Fullness       Patient ID: Urbano Art is a 39 y.o. male with no significant PMHx, pt of Dr. Jerome, here today for focused same-day primary care visit.    Today, patient with complaint of B ear discomfort, R>L.  States he tried cleaning his ears yesterday with a solution of rubbing alcohol and peroxide but says it feels like it didn't "drain out" and now has fullness and discomfort.  No fever.  No recent URI, no nasal congestion, no post nasal drip.    Past Medical History:  History reviewed. No pertinent past medical history.    Home Medications:  Prior to Admission medications    Medication Sig Start Date End Date Taking? Authorizing Provider   erythromycin (ROMYCIN) ophthalmic ointment 1 application in left eye three times a day  Patient not taking: Reported on 6/17/2020 11/14/18      ibuprofen (ADVIL,MOTRIN) 800 MG tablet Take 1 tablet (800 mg total) by mouth every 6 (six) hours as needed for Pain.  Patient not taking: Reported on 6/17/2020 3/6/18   Massimo Reed MD       Allergies:  Review of patient's allergies indicates:  No Known Allergies    Social History:  Social History     Tobacco Use    Smoking status: Former Smoker    Smokeless tobacco: Never Used   Substance Use Topics    Alcohol use: Yes     Alcohol/week: 2.0 standard drinks     Types: 2 Glasses of wine per week     Comment: socially    Drug use: No         Review of Systems   Constitutional: Negative for chills, fatigue and fever.   HENT: Positive for ear pain (just fullness). Negative for congestion, ear discharge, postnasal drip, rhinorrhea, sinus pressure, sinus pain and sore throat.    Respiratory: Negative for cough, chest tightness and shortness of breath.    Cardiovascular: Negative for chest pain.   Gastrointestinal: Negative for abdominal pain and blood in stool.   Genitourinary: Negative for dysuria and frequency.     " "      Objective:   /64 (BP Location: Right arm, Patient Position: Sitting, BP Method: Large (Manual))   Pulse (!) 49   Temp 98.5 °F (36.9 °C)   Ht 5' 8" (1.727 m)   Wt 91.6 kg (201 lb 15.1 oz)   SpO2 97%   BMI 30.71 kg/m²        General: AAO x3, no apparent distress  HEENT: PERRL, OP clear, impacted cerumen on R  L ear c slight cerumen in canal removed c ear scoop s difficulty.      Labs:         Assessment/Plan     Urbano was seen today for ear fullness.    Diagnoses and all orders for this visit:    Impacted cerumen of both ears  L sided cerumen removed c scoop.  R sided flushed and cerumen disimpacted completed.  TM clear and gray visualized after disimpaction.  Discussed avoiding use of Q-tips which can contribute to impaction.    RTC prn and with PCP as per routine.    Karin Alexander MD  Department of Internal Medicine - [facility]  06/17/2020  "

## 2020-12-11 ENCOUNTER — LAB VISIT (OUTPATIENT)
Dept: LAB | Facility: HOSPITAL | Age: 40
End: 2020-12-11
Payer: COMMERCIAL

## 2020-12-11 ENCOUNTER — OFFICE VISIT (OUTPATIENT)
Dept: INTERNAL MEDICINE | Facility: CLINIC | Age: 40
End: 2020-12-11
Payer: COMMERCIAL

## 2020-12-11 VITALS
HEART RATE: 52 BPM | DIASTOLIC BLOOD PRESSURE: 78 MMHG | OXYGEN SATURATION: 99 % | HEIGHT: 68 IN | BODY MASS INDEX: 29.3 KG/M2 | WEIGHT: 193.31 LBS | SYSTOLIC BLOOD PRESSURE: 120 MMHG

## 2020-12-11 DIAGNOSIS — H93.8X9 SENSATION OF FULLNESS IN EAR, UNSPECIFIED LATERALITY: ICD-10-CM

## 2020-12-11 DIAGNOSIS — H61.23 IMPACTED CERUMEN OF BOTH EARS: ICD-10-CM

## 2020-12-11 DIAGNOSIS — H91.90 HEARING LOSS, UNSPECIFIED HEARING LOSS TYPE, UNSPECIFIED LATERALITY: ICD-10-CM

## 2020-12-11 DIAGNOSIS — Z20.822 EXPOSURE TO COVID-19 VIRUS: Primary | ICD-10-CM

## 2020-12-11 DIAGNOSIS — Z20.822 EXPOSURE TO COVID-19 VIRUS: ICD-10-CM

## 2020-12-11 LAB — SARS-COV-2 IGG SERPLBLD QL IA.RAPID: NEGATIVE

## 2020-12-11 PROCEDURE — 36415 COLL VENOUS BLD VENIPUNCTURE: CPT

## 2020-12-11 PROCEDURE — 99214 PR OFFICE/OUTPT VISIT, EST, LEVL IV, 30-39 MIN: ICD-10-PCS | Mod: S$GLB,,, | Performed by: NURSE PRACTITIONER

## 2020-12-11 PROCEDURE — 3008F BODY MASS INDEX DOCD: CPT | Mod: CPTII,S$GLB,, | Performed by: NURSE PRACTITIONER

## 2020-12-11 PROCEDURE — 99999 PR PBB SHADOW E&M-EST. PATIENT-LVL III: CPT | Mod: PBBFAC,,, | Performed by: NURSE PRACTITIONER

## 2020-12-11 PROCEDURE — 99214 OFFICE O/P EST MOD 30 MIN: CPT | Mod: S$GLB,,, | Performed by: NURSE PRACTITIONER

## 2020-12-11 PROCEDURE — 99999 PR PBB SHADOW E&M-EST. PATIENT-LVL III: ICD-10-PCS | Mod: PBBFAC,,, | Performed by: NURSE PRACTITIONER

## 2020-12-11 PROCEDURE — 1126F PR PAIN SEVERITY QUANTIFIED, NO PAIN PRESENT: ICD-10-PCS | Mod: S$GLB,,, | Performed by: NURSE PRACTITIONER

## 2020-12-11 PROCEDURE — 1126F AMNT PAIN NOTED NONE PRSNT: CPT | Mod: S$GLB,,, | Performed by: NURSE PRACTITIONER

## 2020-12-11 PROCEDURE — 3008F PR BODY MASS INDEX (BMI) DOCUMENTED: ICD-10-PCS | Mod: CPTII,S$GLB,, | Performed by: NURSE PRACTITIONER

## 2020-12-11 PROCEDURE — 86769 SARS-COV-2 COVID-19 ANTIBODY: CPT

## 2020-12-11 RX ORDER — FLUTICASONE PROPIONATE 50 MCG
1 SPRAY, SUSPENSION (ML) NASAL DAILY
Qty: 16 G | Refills: 0 | Status: SHIPPED | OUTPATIENT
Start: 2020-12-11 | End: 2022-06-07

## 2020-12-11 NOTE — PROGRESS NOTES
"INTERNAL MEDICINE URGENT CARE NOTE    CHIEF COMPLAINT     Chief Complaint   Patient presents with    Ear Fullness       HPI     Urbano Art is a 40 y.o. male who presents for an urgent visit today.    Here with c/o ear fullness bilaterally. Cerumen disimpacted in May 2020 in office. Worse when laying down - hears "swishing" sounds. Mild hearing loss.     Past Medical History:  Past Medical History:   Diagnosis Date    Cerumen impaction        Home Medications:  Prior to Admission medications    Not on File       Review of Systems:  Review of Systems   Constitutional: Negative for chills and fever.   HENT: Positive for ear pain. Negative for congestion, hearing loss, postnasal drip, rhinorrhea, sinus pressure, sinus pain and sore throat.    Respiratory: Negative for cough, shortness of breath and wheezing.    Cardiovascular: Negative for chest pain and palpitations.   Neurological: Positive for dizziness. Negative for light-headedness and headaches.       Health Maintainence:   Immunizations:  Health Maintenance       Date Due Completion Date    Hepatitis C Screening 1982 ---    HIV Screening 10/27/1997 ---    TETANUS VACCINE 10/27/2000 ---    Influenza Vaccine (1) 08/01/2020 10/6/2014    Lipid Panel 06/28/2023 6/28/2018           PHYSICAL EXAM     /78 (BP Location: Left arm, Patient Position: Sitting, BP Method: Large (Manual))   Pulse (!) 52   Ht 5' 8" (1.727 m)   Wt 87.7 kg (193 lb 5.5 oz)   SpO2 99%   BMI 29.40 kg/m²     Physical Exam  Vitals signs reviewed.   Constitutional:       Appearance: He is well-developed.   HENT:      Head: Normocephalic.      Right Ear: Tympanic membrane and external ear normal.      Left Ear: Tympanic membrane and external ear normal.      Nose: Septal deviation present.      Right Turbinates: Not swollen.      Left Turbinates: Not swollen.      Right Sinus: No maxillary sinus tenderness or frontal sinus tenderness.      Left Sinus: No maxillary sinus " tenderness or frontal sinus tenderness.      Mouth/Throat:      Pharynx: No oropharyngeal exudate.   Eyes:      Pupils: Pupils are equal, round, and reactive to light.   Neck:      Thyroid: No thyromegaly.      Vascular: No JVD.      Trachea: No tracheal deviation.   Cardiovascular:      Rate and Rhythm: Normal rate and regular rhythm.      Heart sounds: No murmur. No friction rub. No gallop.    Pulmonary:      Effort: No respiratory distress.      Breath sounds: Normal breath sounds. No wheezing or rales.   Chest:      Chest wall: No tenderness.   Abdominal:      General: Bowel sounds are normal. There is no distension.      Palpations: Abdomen is soft.      Tenderness: There is no abdominal tenderness.   Musculoskeletal: Normal range of motion.         General: No tenderness.   Lymphadenopathy:      Cervical: No cervical adenopathy.   Skin:     General: Skin is warm and dry.      Findings: No rash.   Neurological:      Mental Status: He is alert and oriented to person, place, and time.   Psychiatric:         Behavior: Behavior normal.         LABS     No results found for: LABA1C, HGBA1C  CMP  Sodium   Date Value Ref Range Status   07/29/2019 142 136 - 145 mmol/L Final     Potassium   Date Value Ref Range Status   07/29/2019 4.0 3.5 - 5.1 mmol/L Final     Chloride   Date Value Ref Range Status   07/29/2019 104 95 - 110 mmol/L Final     CO2   Date Value Ref Range Status   07/29/2019 30 (H) 23 - 29 mmol/L Final     Glucose   Date Value Ref Range Status   07/29/2019 95 70 - 110 mg/dL Final     BUN   Date Value Ref Range Status   07/29/2019 16 6 - 20 mg/dL Final     Creatinine   Date Value Ref Range Status   07/29/2019 1.1 0.5 - 1.4 mg/dL Final     Calcium   Date Value Ref Range Status   07/29/2019 9.4 8.7 - 10.5 mg/dL Final     Total Protein   Date Value Ref Range Status   07/29/2019 7.1 6.0 - 8.4 g/dL Final     Albumin   Date Value Ref Range Status   07/29/2019 4.2 3.5 - 5.2 g/dL Final     Total Bilirubin   Date  Value Ref Range Status   07/29/2019 0.2 0.1 - 1.0 mg/dL Final     Comment:     For infants and newborns, interpretation of results should be based  on gestational age, weight and in agreement with clinical  observations.  Premature Infant recommended reference ranges:  Up to 24 hours.............<8.0 mg/dL  Up to 48 hours............<12.0 mg/dL  3-5 days..................<15.0 mg/dL  6-29 days.................<15.0 mg/dL       Alkaline Phosphatase   Date Value Ref Range Status   07/29/2019 41 (L) 55 - 135 U/L Final     AST   Date Value Ref Range Status   07/29/2019 23 10 - 40 U/L Final     ALT   Date Value Ref Range Status   07/29/2019 48 (H) 10 - 44 U/L Final     Anion Gap   Date Value Ref Range Status   07/29/2019 8 8 - 16 mmol/L Final     eGFR if    Date Value Ref Range Status   07/29/2019 >60 >60 mL/min/1.73 m^2 Final     eGFR if non    Date Value Ref Range Status   07/29/2019 >60 >60 mL/min/1.73 m^2 Final     Comment:     Calculation used to obtain the estimated glomerular filtration  rate (eGFR) is the CKD-EPI equation.        Lab Results   Component Value Date    WBC 4.59 07/29/2019    HGB 12.8 (L) 07/29/2019    HCT 39.6 (L) 07/29/2019    MCV 87 07/29/2019     07/29/2019     Lab Results   Component Value Date    CHOL 168 06/28/2018     Lab Results   Component Value Date    HDL 55 06/28/2018     Lab Results   Component Value Date    LDLCALC 96.8 06/28/2018     Lab Results   Component Value Date    TRIG 81 06/28/2018     Lab Results   Component Value Date    CHOLHDL 32.7 06/28/2018     No results found for: TSH, V1TRKRX, O6VZWHA, THYROIDAB    ASSESSMENT/PLAN     Urbano Art is a 40 y.o. male     Exposure to COVID-19 virus- will send for antibody testing.   -     COVID-19 (SARS CoV-2) IgG Antibody; Future; Expected date: 12/11/2020    Impacted cerumen of both ears- small amount of cerumen in the right ear - no impaction or obstruction     Hearing loss, unspecified  hearing loss type, unspecified laterality- will start flonase and refer to ENT   -     fluticasone propionate (FLONASE) 50 mcg/actuation nasal spray; 1 spray (50 mcg total) by Each Nostril route once daily.  Dispense: 16 g; Refill: 0  -     Ambulatory referral/consult to ENT; Future; Expected date: 12/18/2020    Sensation of fullness in ear, unspecified laterality- will start flonase and refer to ENT   -     Ambulatory referral/consult to ENT; Future; Expected date: 12/18/2020      Follow up with PCP     Patient education provided from [facility]. Patient was counseled on when and how to seek emergent care.       Judy AQUINO, REBECCA, FNP-c   Department of Internal Medicine - [facility]  8:55 AM

## 2020-12-14 ENCOUNTER — PATIENT MESSAGE (OUTPATIENT)
Dept: INTERNAL MEDICINE | Facility: CLINIC | Age: 40
End: 2020-12-14

## 2020-12-16 ENCOUNTER — PATIENT OUTREACH (OUTPATIENT)
Dept: ADMINISTRATIVE | Facility: OTHER | Age: 40
End: 2020-12-16

## 2020-12-18 ENCOUNTER — OFFICE VISIT (OUTPATIENT)
Dept: OTOLARYNGOLOGY | Facility: CLINIC | Age: 40
End: 2020-12-18
Payer: COMMERCIAL

## 2020-12-18 ENCOUNTER — CLINICAL SUPPORT (OUTPATIENT)
Dept: AUDIOLOGY | Facility: CLINIC | Age: 40
End: 2020-12-18
Payer: COMMERCIAL

## 2020-12-18 VITALS — HEART RATE: 65 BPM | SYSTOLIC BLOOD PRESSURE: 103 MMHG | DIASTOLIC BLOOD PRESSURE: 62 MMHG

## 2020-12-18 DIAGNOSIS — H93.8X9 SENSATION OF FULLNESS IN EAR, UNSPECIFIED LATERALITY: ICD-10-CM

## 2020-12-18 DIAGNOSIS — H93.293 ABNORMAL AUDITORY PERCEPTION OF BOTH EARS: Primary | ICD-10-CM

## 2020-12-18 DIAGNOSIS — J30.9 MILD ALLERGIC RHINITIS: ICD-10-CM

## 2020-12-18 DIAGNOSIS — H93.A2 PULSATILE TINNITUS OF LEFT EAR: Primary | ICD-10-CM

## 2020-12-18 DIAGNOSIS — H69.92 DYSFUNCTION OF LEFT EUSTACHIAN TUBE: ICD-10-CM

## 2020-12-18 PROCEDURE — 99204 PR OFFICE/OUTPT VISIT, NEW, LEVL IV, 45-59 MIN: ICD-10-PCS | Mod: S$GLB,,, | Performed by: OTOLARYNGOLOGY

## 2020-12-18 PROCEDURE — 99204 OFFICE O/P NEW MOD 45 MIN: CPT | Mod: S$GLB,,, | Performed by: OTOLARYNGOLOGY

## 2020-12-18 PROCEDURE — 92557 PR COMPREHENSIVE HEARING TEST: ICD-10-PCS | Mod: S$GLB,,, | Performed by: AUDIOLOGIST

## 2020-12-18 PROCEDURE — 99999 PR PBB SHADOW E&M-EST. PATIENT-LVL IV: CPT | Mod: PBBFAC,,, | Performed by: OTOLARYNGOLOGY

## 2020-12-18 PROCEDURE — 92567 TYMPANOMETRY: CPT | Mod: S$GLB,,, | Performed by: AUDIOLOGIST

## 2020-12-18 PROCEDURE — 99999 PR PBB SHADOW E&M-EST. PATIENT-LVL IV: ICD-10-PCS | Mod: PBBFAC,,, | Performed by: OTOLARYNGOLOGY

## 2020-12-18 PROCEDURE — 1126F PR PAIN SEVERITY QUANTIFIED, NO PAIN PRESENT: ICD-10-PCS | Mod: S$GLB,,, | Performed by: OTOLARYNGOLOGY

## 2020-12-18 PROCEDURE — 92567 PR TYMPA2METRY: ICD-10-PCS | Mod: S$GLB,,, | Performed by: AUDIOLOGIST

## 2020-12-18 PROCEDURE — 1126F AMNT PAIN NOTED NONE PRSNT: CPT | Mod: S$GLB,,, | Performed by: OTOLARYNGOLOGY

## 2020-12-18 PROCEDURE — 92557 COMPREHENSIVE HEARING TEST: CPT | Mod: S$GLB,,, | Performed by: AUDIOLOGIST

## 2020-12-18 NOTE — PROGRESS NOTES
"    OTOLARYNGOLOGY CLINIC    Subjective:       Patient ID: Urbano Art is a 40 y.o. male.    Chief Complaint: Hearing Loss, Tinnitus, and Ear Fullness    HPI   .  Patient with the complaint of a 3 week history of pulsation in the left ear with questionable diminished hearing or a "swishing" sound in the ears and the left ear is "clogged up."  The pulsation seems to be like a "heart beat."  Patient has sensation of fullness to the left ear as well.  Patient was scheduled to have wax removed from the ears but none was present.  Patient states that it feels like pressure to the ear like post flying in an air plane, but popping the ears has not relieved the symptoms.  Patient state that symptoms are worse when it is quiet with no noise present.  Patient has had no drainage from the ears.  Patient has take Flonase nasal spray with minimal improvement of symptoms.  Patient state that he did ear two meals with increase salt with symptoms starting the next day.  Patient with minimal nasal symptoms.          Past Medical History:   Diagnosis Date    Cerumen impaction        History reviewed. No pertinent surgical history.      Current Outpatient Medications:     fluticasone propionate (FLONASE) 50 mcg/actuation nasal spray, 1 spray (50 mcg total) by Each Nostril route once daily., Disp: 16 g, Rfl: 0    Review of patient's allergies indicates:  No Known Allergies    Social History     Socioeconomic History    Marital status:      Spouse name: Not on file    Number of children: 1    Years of education: Not on file    Highest education level: Not on file   Occupational History    Occupation:      Comment: [employer]    Occupation:    Social Needs    Financial resource strain: Not on file    Food insecurity     Worry: Not on file     Inability: Not on file    Transportation needs     Medical: Not on file     Non-medical: Not on file   Tobacco Use    Smoking status: " Former Smoker    Smokeless tobacco: Never Used   Substance and Sexual Activity    Alcohol use: Yes     Alcohol/week: 2.0 standard drinks     Types: 2 Glasses of wine per week     Comment: socially    Drug use: No    Sexual activity: Not on file   Lifestyle    Physical activity     Days per week: Not on file     Minutes per session: Not on file    Stress: Not on file   Relationships    Social connections     Talks on phone: Not on file     Gets together: Not on file     Attends Mosque service: Not on file     Active member of club or organization: Not on file     Attends meetings of clubs or organizations: Not on file     Relationship status: Not on file   Other Topics Concern    Not on file   Social History Narrative    Not on file       History reviewed. No pertinent family history.    Review of Systems   Constitutional: Negative.    HENT: Positive for hearing loss (mild to the left ear.), rhinorrhea and tinnitus (pulsatile to the left ear: worse in quiet times.). Negative for congestion, dental problem, ear discharge, ear pain, nosebleeds, postnasal drip, sinus pressure, sinus pain, sneezing, sore throat, trouble swallowing and voice change.    Eyes: Negative.    Respiratory: Negative.  Negative for cough.    Cardiovascular: Negative.    Gastrointestinal: Positive for diarrhea. Negative for abdominal pain.   Endocrine: Negative.    Genitourinary: Negative.    Musculoskeletal: Negative.  Negative for arthralgias.   Skin: Negative.    Allergic/Immunologic: Negative.    Neurological: Negative.  Negative for dizziness and light-headedness.   Hematological: Negative.    Psychiatric/Behavioral: Negative for behavioral problems. The patient is nervous/anxious.        Objective:     Physical Exam  Vitals signs reviewed.   Constitutional:       Appearance: Normal appearance. He is well-developed.   HENT:      Head: Normocephalic and atraumatic.      Jaw: There is normal jaw occlusion. No trismus or tenderness.       Salivary Glands: Right salivary gland is not diffusely enlarged or tender. Left salivary gland is not diffusely enlarged or tender.      Right Ear: Hearing, tympanic membrane, ear canal and external ear normal. No tenderness. There is no impacted cerumen. No hemotympanum. Tympanic membrane is not injected, scarred, perforated, erythematous, retracted or bulging.      Left Ear: Hearing, tympanic membrane, ear canal and external ear normal. No tenderness. There is no impacted cerumen. No hemotympanum. Tympanic membrane is not injected, scarred, perforated, erythematous, retracted or bulging.      Nose: Nose normal. No nasal deformity, septal deviation, congestion (mild with allergie appearing with mild bluish/redish tint.) or rhinorrhea.      Right Turbinates: Not enlarged.      Left Turbinates: Not enlarged.      Right Sinus: No maxillary sinus tenderness or frontal sinus tenderness.      Left Sinus: No maxillary sinus tenderness or frontal sinus tenderness.      Mouth/Throat:      Mouth: No oral lesions.      Tongue: No lesions. Tongue does not deviate from midline.      Palate: No mass and lesions.      Pharynx: Uvula midline. No oropharyngeal exudate, posterior oropharyngeal erythema or uvula swelling.      Tonsils: No tonsillar exudate or tonsillar abscesses.   Eyes:      General: Lids are normal.         Right eye: No discharge.         Left eye: No discharge.      Extraocular Movements: Extraocular movements intact.      Pupils: Pupils are equal, round, and reactive to light.   Neck:      Musculoskeletal: Normal range of motion and neck supple.      Thyroid: No thyromegaly.   Pulmonary:      Effort: Pulmonary effort is normal.   Musculoskeletal: Normal range of motion.   Lymphadenopathy:      Cervical: No cervical adenopathy.   Skin:     General: Skin is warm.   Neurological:      Mental Status: He is alert and oriented to person, place, and time.   Psychiatric:         Behavior: Behavior is cooperative.          Thought Content: Thought content normal.              Assessment & Plan:         Problem List Items Addressed This Visit     None      Visit Diagnoses     Pulsatile tinnitus of left ear    -  Primary    Sensation of fullness in ear, unspecified laterality        Dysfunction of left eustachian tube        Mild allergic rhinitis            Patient Instructions   1.  Audiogram/tympanogram with results and copy of test given to patient.  2.  Noise protection with ear muffs/ear plugs as required for noise protection.  3.  Keep ears dry and no Q-tip use.    4.  Audiogram/tympanogram yearly with ENT appointment.  5.  Return to ENT as needed for change symptoms or for other ENT symptoms.  6.  Flonase nasal inhaler OTC:  2 puffs each side of the nose daily for 3 weeks and then as needed for ear/nasal symptoms.  7.  Zyrtec 10 mg tablets, OTC:  Take one by mouth once per day for 3 weeks and then as needed for ear/nasal symptoms.  7.  Auto-insulflate eustachian tube ten times three times per day for 3 weeks and then as needed for symptoms.  8.  Return to ENT in 3-6 weeks to see Dr. Dayton Ruiz, Otology, Neuro-Otology for evaluation of Pulsatile Tinnitus and possible additional testing such as CT scan if indicated and patient has increasing symptoms or without improvement.

## 2020-12-18 NOTE — LETTER
December 18, 2020      Judy Cardona, NP  [address]  [facility] [address]           [facility] [address]  [address]  [facility] [address]  Phone: [phone]  Fax: [phone]          Patient: Urbano Art   MR Number: [id number]   YOB: 1980   Date of Visit: 12/18/2020       Dear Judy Cardona:    Thank you for referring Urbano Art to me for evaluation. Attached you will find relevant portions of my assessment and plan of care.    If you have questions, please do not hesitate to call me. I look forward to following Urbano Art along with you.    Sincerely,    Donovan Roper MD    Enclosure  CC:  No Recipients    If you would like to receive this communication electronically, please contact [email or web address] or [phone] to request more information on [employer] access.    For providers and/or their staff who would like to refer a patient to [facility], please contact us through our one-stop-shop provider referral line, [facility], [place], at [phone].    If you feel you have received this communication in error or would no longer like to receive these types of communications, please e-mail [email or web address]

## 2020-12-18 NOTE — PATIENT INSTRUCTIONS
1.  Audiogram/tympanogram with results and copy of test given to patient.  2.  Noise protection with ear muffs/ear plugs as required for noise protection.  3.  Keep ears dry and no Q-tip use.    4.  Audiogram/tympanogram yearly with ENT appointment.  5.  Return to ENT as needed for change symptoms or for other ENT symptoms.  6.  Flonase nasal inhaler OTC:  2 puffs each side of the nose daily for 3 weeks and then as needed for ear/nasal symptoms.  7.  Zyrtec 10 mg tablets, OTC:  Take one by mouth once per day for 3 weeks and then as needed for ear/nasal symptoms.  7.  Auto-insulflate eustachian tube ten times three times per day for 3 weeks and then as needed for symptoms.  8.  Return to ENT in 3-6 weeks to see Dr. Dayton Ruiz, Otology, Neuro-Otology for evaluation of Pulsatile Tinnitus and possible additional testing such as CT scan if indicated and patient has increasing symptoms or without improvement.

## 2020-12-18 NOTE — PROGRESS NOTES
Urbano Art was seen today for a hearing evaluation.     Pure tone audiometry revealed normal hearing, AU  Speech Reception Threshold (SRT) and Pure Tone Average (PTA) are in Good agreement bilaterally. Indicating good test/re-test reliability   Excellent speech discrimination for the right ear: Excellent  speech discrimination for the left ear   Tympanometry revealed Type A for the right ear, Type A for the left ear    Recommendations:  1. Otologic Evaluation  2. Repeat audiogram as needed  3. Hearing Protection

## 2021-01-02 ENCOUNTER — PATIENT MESSAGE (OUTPATIENT)
Dept: INTERNAL MEDICINE | Facility: CLINIC | Age: 41
End: 2021-01-02

## 2021-01-08 ENCOUNTER — TELEPHONE (OUTPATIENT)
Dept: INTERNAL MEDICINE | Facility: CLINIC | Age: 41
End: 2021-01-08

## 2021-01-08 DIAGNOSIS — Z00.00 ANNUAL PHYSICAL EXAM: Primary | ICD-10-CM

## 2021-01-13 ENCOUNTER — PATIENT MESSAGE (OUTPATIENT)
Dept: OTOLARYNGOLOGY | Facility: CLINIC | Age: 41
End: 2021-01-13

## 2021-01-15 ENCOUNTER — OFFICE VISIT (OUTPATIENT)
Dept: OTOLARYNGOLOGY | Facility: CLINIC | Age: 41
End: 2021-01-15
Payer: COMMERCIAL

## 2021-01-15 DIAGNOSIS — H93.A2 PULSATILE TINNITUS, LEFT EAR: Primary | ICD-10-CM

## 2021-01-15 PROCEDURE — 99999 PR PBB SHADOW E&M-EST. PATIENT-LVL I: CPT | Mod: PBBFAC,,, | Performed by: OTOLARYNGOLOGY

## 2021-01-15 PROCEDURE — 99213 OFFICE O/P EST LOW 20 MIN: CPT | Mod: S$GLB,,, | Performed by: OTOLARYNGOLOGY

## 2021-01-15 PROCEDURE — 99213 PR OFFICE/OUTPT VISIT, EST, LEVL III, 20-29 MIN: ICD-10-PCS | Mod: S$GLB,,, | Performed by: OTOLARYNGOLOGY

## 2021-01-15 PROCEDURE — 99999 PR PBB SHADOW E&M-EST. PATIENT-LVL I: ICD-10-PCS | Mod: PBBFAC,,, | Performed by: OTOLARYNGOLOGY

## 2021-01-22 ENCOUNTER — IMMUNIZATION (OUTPATIENT)
Dept: PHARMACY | Facility: CLINIC | Age: 41
End: 2021-01-22
Payer: COMMERCIAL

## 2021-01-22 DIAGNOSIS — Z23 NEED FOR VACCINATION: Primary | ICD-10-CM

## 2021-02-19 ENCOUNTER — IMMUNIZATION (OUTPATIENT)
Dept: PHARMACY | Facility: CLINIC | Age: 41
End: 2021-02-19
Payer: COMMERCIAL

## 2021-02-19 DIAGNOSIS — Z23 NEED FOR VACCINATION: Primary | ICD-10-CM

## 2021-03-09 ENCOUNTER — PATIENT MESSAGE (OUTPATIENT)
Dept: RESEARCH | Facility: HOSPITAL | Age: 41
End: 2021-03-09

## 2021-03-18 ENCOUNTER — PATIENT MESSAGE (OUTPATIENT)
Dept: RESEARCH | Facility: HOSPITAL | Age: 41
End: 2021-03-18

## 2021-03-26 ENCOUNTER — PATIENT MESSAGE (OUTPATIENT)
Dept: RESEARCH | Facility: HOSPITAL | Age: 41
End: 2021-03-26

## 2021-05-13 ENCOUNTER — TELEPHONE (OUTPATIENT)
Dept: OTOLARYNGOLOGY | Facility: CLINIC | Age: 41
End: 2021-05-13

## 2021-05-13 ENCOUNTER — OFFICE VISIT (OUTPATIENT)
Dept: INTERNAL MEDICINE | Facility: CLINIC | Age: 41
End: 2021-05-13
Attending: FAMILY MEDICINE
Payer: COMMERCIAL

## 2021-05-13 ENCOUNTER — LAB VISIT (OUTPATIENT)
Dept: LAB | Facility: HOSPITAL | Age: 41
End: 2021-05-13
Attending: FAMILY MEDICINE
Payer: COMMERCIAL

## 2021-05-13 VITALS
HEART RATE: 68 BPM | DIASTOLIC BLOOD PRESSURE: 70 MMHG | OXYGEN SATURATION: 98 % | HEIGHT: 68 IN | SYSTOLIC BLOOD PRESSURE: 112 MMHG | WEIGHT: 189 LBS | BODY MASS INDEX: 28.64 KG/M2

## 2021-05-13 DIAGNOSIS — L98.9 SKIN LESION: ICD-10-CM

## 2021-05-13 DIAGNOSIS — R10.9 ABDOMINAL PAIN, UNSPECIFIED ABDOMINAL LOCATION: ICD-10-CM

## 2021-05-13 DIAGNOSIS — I83.813 VARICOSE VEINS OF BOTH LOWER EXTREMITIES WITH PAIN: ICD-10-CM

## 2021-05-13 DIAGNOSIS — H93.A9 PULSATILE TINNITUS: ICD-10-CM

## 2021-05-13 DIAGNOSIS — Z00.00 ANNUAL PHYSICAL EXAM: Primary | ICD-10-CM

## 2021-05-13 PROBLEM — I83.91 VARICOSE VEINS OF RIGHT LOWER EXTREMITY: Status: RESOLVED | Noted: 2021-05-13 | Resolved: 2021-05-13

## 2021-05-13 PROBLEM — I83.91 VARICOSE VEINS OF RIGHT LOWER EXTREMITY: Status: ACTIVE | Noted: 2021-05-13

## 2021-05-13 LAB — LIPASE SERPL-CCNC: 46 U/L (ref 4–60)

## 2021-05-13 PROCEDURE — 99395 PREV VISIT EST AGE 18-39: CPT | Mod: S$GLB,,, | Performed by: FAMILY MEDICINE

## 2021-05-13 PROCEDURE — 1126F AMNT PAIN NOTED NONE PRSNT: CPT | Mod: S$GLB,,, | Performed by: FAMILY MEDICINE

## 2021-05-13 PROCEDURE — 3008F BODY MASS INDEX DOCD: CPT | Mod: CPTII,S$GLB,, | Performed by: FAMILY MEDICINE

## 2021-05-13 PROCEDURE — 36415 COLL VENOUS BLD VENIPUNCTURE: CPT | Performed by: FAMILY MEDICINE

## 2021-05-13 PROCEDURE — 99999 PR PBB SHADOW E&M-EST. PATIENT-LVL V: ICD-10-PCS | Mod: PBBFAC,,, | Performed by: FAMILY MEDICINE

## 2021-05-13 PROCEDURE — 99395 PR PREVENTIVE VISIT,EST,18-39: ICD-10-PCS | Mod: S$GLB,,, | Performed by: FAMILY MEDICINE

## 2021-05-13 PROCEDURE — 1126F PR PAIN SEVERITY QUANTIFIED, NO PAIN PRESENT: ICD-10-PCS | Mod: S$GLB,,, | Performed by: FAMILY MEDICINE

## 2021-05-13 PROCEDURE — 83690 ASSAY OF LIPASE: CPT | Performed by: FAMILY MEDICINE

## 2021-05-13 PROCEDURE — 3008F PR BODY MASS INDEX (BMI) DOCUMENTED: ICD-10-PCS | Mod: CPTII,S$GLB,, | Performed by: FAMILY MEDICINE

## 2021-05-13 PROCEDURE — 99999 PR PBB SHADOW E&M-EST. PATIENT-LVL V: CPT | Mod: PBBFAC,,, | Performed by: FAMILY MEDICINE

## 2021-05-14 ENCOUNTER — HOSPITAL ENCOUNTER (OUTPATIENT)
Dept: RADIOLOGY | Facility: HOSPITAL | Age: 41
Discharge: HOME | End: 2021-05-14
Attending: FAMILY MEDICINE
Payer: COMMERCIAL

## 2021-05-14 ENCOUNTER — PATIENT MESSAGE (OUTPATIENT)
Dept: INTERNAL MEDICINE | Facility: CLINIC | Age: 41
End: 2021-05-14

## 2021-05-14 ENCOUNTER — HOSPITAL ENCOUNTER (OUTPATIENT)
Dept: CARDIOLOGY | Facility: HOSPITAL | Age: 41
Discharge: HOME | End: 2021-05-14
Attending: FAMILY MEDICINE
Payer: COMMERCIAL

## 2021-05-14 ENCOUNTER — TELEPHONE (OUTPATIENT)
Dept: INTERNAL MEDICINE | Facility: CLINIC | Age: 41
End: 2021-05-14

## 2021-05-14 DIAGNOSIS — I83.813 VARICOSE VEINS OF BOTH LOWER EXTREMITIES WITH PAIN: ICD-10-CM

## 2021-05-14 DIAGNOSIS — R10.9 ABDOMINAL PAIN, UNSPECIFIED ABDOMINAL LOCATION: ICD-10-CM

## 2021-05-14 DIAGNOSIS — I83.813 VARICOSE VEINS OF BOTH LOWER EXTREMITIES WITH PAIN: Primary | ICD-10-CM

## 2021-05-14 PROCEDURE — 76700 US EXAM ABDOM COMPLETE: CPT | Mod: 26,,, | Performed by: RADIOLOGY

## 2021-05-14 PROCEDURE — 93970 EXTREMITY STUDY: CPT | Mod: 50

## 2021-05-14 PROCEDURE — 76700 US ABDOMEN COMPLETE: ICD-10-PCS | Mod: 26,,, | Performed by: RADIOLOGY

## 2021-05-14 PROCEDURE — 93970 EXTREMITY STUDY: CPT | Mod: 26,,, | Performed by: INTERNAL MEDICINE

## 2021-05-14 PROCEDURE — 76700 US EXAM ABDOM COMPLETE: CPT | Mod: TC

## 2021-05-14 PROCEDURE — 93970 CV US DOPPLER VENOUS LEGS BILATERAL (CUPID ONLY): ICD-10-PCS | Mod: 26,,, | Performed by: INTERNAL MEDICINE

## 2021-05-16 ENCOUNTER — TELEPHONE (OUTPATIENT)
Dept: INTERNAL MEDICINE | Facility: CLINIC | Age: 41
End: 2021-05-16

## 2021-05-16 ENCOUNTER — PATIENT OUTREACH (OUTPATIENT)
Dept: ADMINISTRATIVE | Facility: OTHER | Age: 41
End: 2021-05-16

## 2021-05-16 DIAGNOSIS — R82.90 ABNORMAL URINALYSIS: Primary | ICD-10-CM

## 2021-05-17 ENCOUNTER — PATIENT MESSAGE (OUTPATIENT)
Dept: INTERNAL MEDICINE | Facility: CLINIC | Age: 41
End: 2021-05-17

## 2021-05-18 ENCOUNTER — TELEPHONE (OUTPATIENT)
Dept: INTERNAL MEDICINE | Facility: CLINIC | Age: 41
End: 2021-05-18

## 2021-05-18 ENCOUNTER — PATIENT MESSAGE (OUTPATIENT)
Dept: INTERNAL MEDICINE | Facility: CLINIC | Age: 41
End: 2021-05-18

## 2021-05-18 ENCOUNTER — OFFICE VISIT (OUTPATIENT)
Dept: DERMATOLOGY | Facility: CLINIC | Age: 41
End: 2021-05-18
Attending: FAMILY MEDICINE
Payer: COMMERCIAL

## 2021-05-18 ENCOUNTER — TELEPHONE (OUTPATIENT)
Dept: VASCULAR SURGERY | Facility: CLINIC | Age: 41
End: 2021-05-18

## 2021-05-18 DIAGNOSIS — I87.2 VENOUS INSUFFICIENCY: Primary | ICD-10-CM

## 2021-05-18 DIAGNOSIS — L21.9 SEBORRHEIC DERMATITIS: Primary | ICD-10-CM

## 2021-05-18 DIAGNOSIS — B07.9 FILIFORM WARTS: ICD-10-CM

## 2021-05-18 DIAGNOSIS — L98.9 SKIN LESION: ICD-10-CM

## 2021-05-18 DIAGNOSIS — L91.0 KELOID: ICD-10-CM

## 2021-05-18 DIAGNOSIS — I83.813 VARICOSE VEINS OF BOTH LOWER EXTREMITIES WITH PAIN: Primary | ICD-10-CM

## 2021-05-18 PROCEDURE — 11900 INJECT SKIN LESIONS </W 7: CPT | Mod: 59,S$GLB,, | Performed by: STUDENT IN AN ORGANIZED HEALTH CARE EDUCATION/TRAINING PROGRAM

## 2021-05-18 PROCEDURE — 99999 PR PBB SHADOW E&M-EST. PATIENT-LVL II: ICD-10-PCS | Mod: PBBFAC,,, | Performed by: DERMATOLOGY

## 2021-05-18 PROCEDURE — 99999 PR PBB SHADOW E&M-EST. PATIENT-LVL II: CPT | Mod: PBBFAC,,, | Performed by: DERMATOLOGY

## 2021-05-18 PROCEDURE — 17110 PR DESTRUCTION BENIGN LESIONS UP TO 14: ICD-10-PCS | Mod: S$GLB,,, | Performed by: STUDENT IN AN ORGANIZED HEALTH CARE EDUCATION/TRAINING PROGRAM

## 2021-05-18 PROCEDURE — 11900 PR INJECTION INTO SKIN LESIONS, UP TO 7: ICD-10-PCS | Mod: 59,S$GLB,, | Performed by: STUDENT IN AN ORGANIZED HEALTH CARE EDUCATION/TRAINING PROGRAM

## 2021-05-18 PROCEDURE — 17110 DESTRUCTION B9 LES UP TO 14: CPT | Mod: S$GLB,,, | Performed by: STUDENT IN AN ORGANIZED HEALTH CARE EDUCATION/TRAINING PROGRAM

## 2021-05-18 PROCEDURE — 99204 PR OFFICE/OUTPT VISIT, NEW, LEVL IV, 45-59 MIN: ICD-10-PCS | Mod: 25,S$GLB,, | Performed by: DERMATOLOGY

## 2021-05-18 PROCEDURE — 99204 OFFICE O/P NEW MOD 45 MIN: CPT | Mod: 25,S$GLB,, | Performed by: DERMATOLOGY

## 2021-05-18 RX ORDER — KETOCONAZOLE 2 G/100G
1 AEROSOL, FOAM TOPICAL 2 TIMES DAILY
Qty: 100 G | Refills: 11 | Status: SHIPPED | OUTPATIENT
Start: 2021-05-18

## 2021-05-19 ENCOUNTER — TELEPHONE (OUTPATIENT)
Dept: VASCULAR SURGERY | Facility: CLINIC | Age: 41
End: 2021-05-19

## 2021-05-20 ENCOUNTER — HOSPITAL ENCOUNTER (OUTPATIENT)
Dept: VASCULAR SURGERY | Facility: CLINIC | Age: 41
Discharge: HOME | End: 2021-05-20
Attending: SURGERY
Payer: COMMERCIAL

## 2021-05-20 DIAGNOSIS — I87.2 VENOUS INSUFFICIENCY: ICD-10-CM

## 2021-05-20 PROCEDURE — 93970 PR US DUPLEX, UPPER OR LOWER EXT VENOUS,COMPLETE BILAT: ICD-10-PCS | Mod: S$GLB,,, | Performed by: SURGERY

## 2021-05-20 PROCEDURE — 93970 EXTREMITY STUDY: CPT | Mod: S$GLB,,, | Performed by: SURGERY

## 2021-05-24 ENCOUNTER — TELEPHONE (OUTPATIENT)
Dept: PHARMACY | Facility: CLINIC | Age: 41
End: 2021-05-24

## 2021-05-24 ENCOUNTER — INITIAL CONSULT (OUTPATIENT)
Dept: VASCULAR SURGERY | Facility: CLINIC | Age: 41
End: 2021-05-24
Attending: SURGERY
Payer: COMMERCIAL

## 2021-05-24 VITALS
DIASTOLIC BLOOD PRESSURE: 71 MMHG | SYSTOLIC BLOOD PRESSURE: 118 MMHG | BODY MASS INDEX: 28.74 KG/M2 | HEART RATE: 45 BPM | HEIGHT: 68 IN | TEMPERATURE: 98 F | WEIGHT: 189.63 LBS

## 2021-05-24 DIAGNOSIS — I83.813 VARICOSE VEINS OF BOTH LOWER EXTREMITIES WITH PAIN: ICD-10-CM

## 2021-05-24 DIAGNOSIS — I87.2 VENOUS INSUFFICIENCY OF BOTH LOWER EXTREMITIES: Primary | ICD-10-CM

## 2021-05-24 PROCEDURE — 99202 OFFICE O/P NEW SF 15 MIN: CPT | Mod: S$GLB,,, | Performed by: SURGERY

## 2021-05-24 PROCEDURE — 99999 PR PBB SHADOW E&M-EST. PATIENT-LVL III: CPT | Mod: PBBFAC,,, | Performed by: SURGERY

## 2021-05-24 PROCEDURE — 99202 PR OFFICE/OUTPT VISIT, NEW, LEVL II, 15-29 MIN: ICD-10-PCS | Mod: S$GLB,,, | Performed by: SURGERY

## 2021-05-24 PROCEDURE — 99999 PR PBB SHADOW E&M-EST. PATIENT-LVL III: ICD-10-PCS | Mod: PBBFAC,,, | Performed by: SURGERY

## 2021-05-25 ENCOUNTER — PATIENT MESSAGE (OUTPATIENT)
Dept: VASCULAR SURGERY | Facility: CLINIC | Age: 41
End: 2021-05-25

## 2021-05-25 ENCOUNTER — OFFICE VISIT (OUTPATIENT)
Dept: OTOLARYNGOLOGY | Facility: CLINIC | Age: 41
End: 2021-05-25
Payer: COMMERCIAL

## 2021-05-25 VITALS — WEIGHT: 192.25 LBS | BODY MASS INDEX: 29.23 KG/M2

## 2021-05-25 DIAGNOSIS — H93.A9 PULSATILE TINNITUS, UNSPECIFIED EAR: ICD-10-CM

## 2021-05-25 DIAGNOSIS — H93.A2 PULSATILE TINNITUS, LEFT EAR: Primary | ICD-10-CM

## 2021-05-25 DIAGNOSIS — M95.0 ACQUIRED NASAL DEFORMITY: ICD-10-CM

## 2021-05-25 PROCEDURE — 3008F BODY MASS INDEX DOCD: CPT | Mod: CPTII,S$GLB,, | Performed by: OTOLARYNGOLOGY

## 2021-05-25 PROCEDURE — 1126F AMNT PAIN NOTED NONE PRSNT: CPT | Mod: S$GLB,,, | Performed by: OTOLARYNGOLOGY

## 2021-05-25 PROCEDURE — 3008F PR BODY MASS INDEX (BMI) DOCUMENTED: ICD-10-PCS | Mod: CPTII,S$GLB,, | Performed by: OTOLARYNGOLOGY

## 2021-05-25 PROCEDURE — 99213 OFFICE O/P EST LOW 20 MIN: CPT | Mod: S$GLB,,, | Performed by: OTOLARYNGOLOGY

## 2021-05-25 PROCEDURE — 99999 PR PBB SHADOW E&M-EST. PATIENT-LVL III: CPT | Mod: PBBFAC,,, | Performed by: OTOLARYNGOLOGY

## 2021-05-25 PROCEDURE — 1126F PR PAIN SEVERITY QUANTIFIED, NO PAIN PRESENT: ICD-10-PCS | Mod: S$GLB,,, | Performed by: OTOLARYNGOLOGY

## 2021-05-25 PROCEDURE — 99999 PR PBB SHADOW E&M-EST. PATIENT-LVL III: ICD-10-PCS | Mod: PBBFAC,,, | Performed by: OTOLARYNGOLOGY

## 2021-05-25 PROCEDURE — 99213 PR OFFICE/OUTPT VISIT, EST, LEVL III, 20-29 MIN: ICD-10-PCS | Mod: S$GLB,,, | Performed by: OTOLARYNGOLOGY

## 2021-05-26 ENCOUNTER — PATIENT MESSAGE (OUTPATIENT)
Dept: OTOLARYNGOLOGY | Facility: CLINIC | Age: 41
End: 2021-05-26

## 2021-05-31 ENCOUNTER — HOSPITAL ENCOUNTER (OUTPATIENT)
Dept: RADIOLOGY | Facility: HOSPITAL | Age: 41
Discharge: HOME | End: 2021-05-31
Attending: OTOLARYNGOLOGY
Payer: COMMERCIAL

## 2021-05-31 DIAGNOSIS — H93.A2 PULSATILE TINNITUS, LEFT EAR: ICD-10-CM

## 2021-05-31 DIAGNOSIS — H93.A9 PULSATILE TINNITUS, UNSPECIFIED EAR: ICD-10-CM

## 2021-05-31 PROCEDURE — 70498 CTA HEAD AND NECK (XPD): ICD-10-PCS | Mod: 26,,, | Performed by: RADIOLOGY

## 2021-05-31 PROCEDURE — 70496 CT ANGIOGRAPHY HEAD: CPT | Mod: 26,,, | Performed by: RADIOLOGY

## 2021-05-31 PROCEDURE — 70498 CT ANGIOGRAPHY NECK: CPT | Mod: 26,,, | Performed by: RADIOLOGY

## 2021-05-31 PROCEDURE — 70496 CTA HEAD AND NECK (XPD): ICD-10-PCS | Mod: 26,,, | Performed by: RADIOLOGY

## 2021-05-31 PROCEDURE — 70498 CT ANGIOGRAPHY NECK: CPT | Mod: TC

## 2021-05-31 PROCEDURE — 25500020 PHARM REV CODE 255: Performed by: OTOLARYNGOLOGY

## 2021-05-31 PROCEDURE — 70496 CT ANGIOGRAPHY HEAD: CPT | Mod: TC

## 2021-05-31 RX ADMIN — IOHEXOL 100 ML: 350 INJECTION, SOLUTION INTRAVENOUS at 01:05

## 2021-06-11 RX ORDER — TRIAMCINOLONE ACETONIDE 40 MG/ML
20 INJECTION, SUSPENSION INTRA-ARTICULAR; INTRAMUSCULAR
Status: DISCONTINUED | OUTPATIENT
Start: 2021-06-11 | End: 2023-10-31

## 2021-07-02 ENCOUNTER — PATIENT OUTREACH (OUTPATIENT)
Dept: ADMINISTRATIVE | Facility: OTHER | Age: 41
End: 2021-07-02

## 2021-07-06 ENCOUNTER — TELEPHONE (OUTPATIENT)
Dept: OTOLARYNGOLOGY | Facility: CLINIC | Age: 41
End: 2021-07-06

## 2021-07-07 ENCOUNTER — TELEPHONE (OUTPATIENT)
Dept: DERMATOLOGY | Facility: CLINIC | Age: 41
End: 2021-07-07

## 2021-07-09 ENCOUNTER — PATIENT MESSAGE (OUTPATIENT)
Dept: DERMATOLOGY | Facility: CLINIC | Age: 41
End: 2021-07-09

## 2021-07-09 RX ORDER — KETOCONAZOLE 20 MG/G
CREAM TOPICAL 2 TIMES DAILY
Qty: 60 G | Refills: 2 | Status: SHIPPED | OUTPATIENT
Start: 2021-07-09

## 2021-08-26 ENCOUNTER — PATIENT MESSAGE (OUTPATIENT)
Dept: INTERNAL MEDICINE | Facility: CLINIC | Age: 41
End: 2021-08-26

## 2021-08-27 ENCOUNTER — PATIENT MESSAGE (OUTPATIENT)
Dept: INTERNAL MEDICINE | Facility: CLINIC | Age: 41
End: 2021-08-27

## 2021-11-02 ENCOUNTER — OFFICE VISIT (OUTPATIENT)
Dept: DERMATOLOGY | Facility: CLINIC | Age: 41
End: 2021-11-02
Payer: COMMERCIAL

## 2021-11-02 DIAGNOSIS — L91.0 KELOID: Primary | ICD-10-CM

## 2021-11-02 DIAGNOSIS — L21.9 SEBORRHEIC DERMATITIS: ICD-10-CM

## 2021-11-02 PROCEDURE — 99213 PR OFFICE/OUTPT VISIT, EST, LEVL III, 20-29 MIN: ICD-10-PCS | Mod: 25,S$GLB,, | Performed by: STUDENT IN AN ORGANIZED HEALTH CARE EDUCATION/TRAINING PROGRAM

## 2021-11-02 PROCEDURE — 11900 PR INJECTION INTO SKIN LESIONS, UP TO 7: ICD-10-PCS | Mod: S$GLB,,, | Performed by: STUDENT IN AN ORGANIZED HEALTH CARE EDUCATION/TRAINING PROGRAM

## 2021-11-02 PROCEDURE — 99999 PR PBB SHADOW E&M-EST. PATIENT-LVL II: CPT | Mod: PBBFAC,,,

## 2021-11-02 PROCEDURE — 99213 OFFICE O/P EST LOW 20 MIN: CPT | Mod: 25,S$GLB,, | Performed by: STUDENT IN AN ORGANIZED HEALTH CARE EDUCATION/TRAINING PROGRAM

## 2021-11-02 PROCEDURE — 11900 INJECT SKIN LESIONS </W 7: CPT | Mod: S$GLB,,, | Performed by: STUDENT IN AN ORGANIZED HEALTH CARE EDUCATION/TRAINING PROGRAM

## 2021-11-02 PROCEDURE — 99999 PR PBB SHADOW E&M-EST. PATIENT-LVL II: ICD-10-PCS | Mod: PBBFAC,,,

## 2021-11-29 ENCOUNTER — OFFICE VISIT (OUTPATIENT)
Dept: DERMATOLOGY | Facility: CLINIC | Age: 41
End: 2021-11-29
Payer: COMMERCIAL

## 2021-11-29 DIAGNOSIS — I78.1 TELANGIECTASIA: ICD-10-CM

## 2021-11-29 DIAGNOSIS — L91.0 KELOID: Primary | ICD-10-CM

## 2021-11-29 PROCEDURE — 99999 PR PBB SHADOW E&M-EST. PATIENT-LVL II: ICD-10-PCS | Mod: PBBFAC,,,

## 2021-11-29 PROCEDURE — 11900 PR INJECTION INTO SKIN LESIONS, UP TO 7: ICD-10-PCS | Mod: S$GLB,,, | Performed by: STUDENT IN AN ORGANIZED HEALTH CARE EDUCATION/TRAINING PROGRAM

## 2021-11-29 PROCEDURE — 11900 INJECT SKIN LESIONS </W 7: CPT | Mod: S$GLB,,, | Performed by: STUDENT IN AN ORGANIZED HEALTH CARE EDUCATION/TRAINING PROGRAM

## 2021-11-29 PROCEDURE — 99999 PR PBB SHADOW E&M-EST. PATIENT-LVL II: CPT | Mod: PBBFAC,,,

## 2021-12-05 ENCOUNTER — OFFICE VISIT (OUTPATIENT)
Dept: URGENT CARE | Facility: CLINIC | Age: 41
End: 2021-12-05
Payer: COMMERCIAL

## 2021-12-05 VITALS
TEMPERATURE: 99 F | OXYGEN SATURATION: 97 % | DIASTOLIC BLOOD PRESSURE: 56 MMHG | HEART RATE: 69 BPM | HEIGHT: 68 IN | WEIGHT: 190 LBS | BODY MASS INDEX: 28.79 KG/M2 | SYSTOLIC BLOOD PRESSURE: 105 MMHG

## 2021-12-05 DIAGNOSIS — R21 RASH AND NONSPECIFIC SKIN ERUPTION: Primary | ICD-10-CM

## 2021-12-05 DIAGNOSIS — L29.9 PRURITUS: ICD-10-CM

## 2021-12-05 PROCEDURE — 99213 PR OFFICE/OUTPT VISIT, EST, LEVL III, 20-29 MIN: ICD-10-PCS | Mod: S$GLB,,, | Performed by: PHYSICIAN ASSISTANT

## 2021-12-05 PROCEDURE — 99213 OFFICE O/P EST LOW 20 MIN: CPT | Mod: S$GLB,,, | Performed by: PHYSICIAN ASSISTANT

## 2021-12-05 RX ORDER — HYDROCORTISONE 1 %
CREAM (GRAM) TOPICAL 2 TIMES DAILY
COMMUNITY
End: 2023-10-31

## 2021-12-05 RX ORDER — VALACYCLOVIR HYDROCHLORIDE 1 G/1
1000 TABLET, FILM COATED ORAL 2 TIMES DAILY
Qty: 7 TABLET | Refills: 0 | Status: SHIPPED | OUTPATIENT
Start: 2021-12-05 | End: 2022-06-07

## 2021-12-06 ENCOUNTER — OFFICE VISIT (OUTPATIENT)
Dept: DERMATOLOGY | Facility: CLINIC | Age: 41
End: 2021-12-06
Payer: COMMERCIAL

## 2021-12-06 DIAGNOSIS — B02.9 HERPES ZOSTER WITHOUT COMPLICATION: Primary | ICD-10-CM

## 2021-12-06 PROCEDURE — 99999 PR PBB SHADOW E&M-EST. PATIENT-LVL II: CPT | Mod: PBBFAC,,, | Performed by: DERMATOLOGY

## 2021-12-06 PROCEDURE — 99999 PR PBB SHADOW E&M-EST. PATIENT-LVL II: ICD-10-PCS | Mod: PBBFAC,,, | Performed by: DERMATOLOGY

## 2021-12-06 PROCEDURE — 99213 PR OFFICE/OUTPT VISIT, EST, LEVL III, 20-29 MIN: ICD-10-PCS | Mod: S$GLB,,, | Performed by: DERMATOLOGY

## 2021-12-06 PROCEDURE — 99213 OFFICE O/P EST LOW 20 MIN: CPT | Mod: S$GLB,,, | Performed by: DERMATOLOGY

## 2021-12-06 RX ORDER — VALACYCLOVIR HYDROCHLORIDE 1 G/1
1000 TABLET, FILM COATED ORAL 3 TIMES DAILY
Qty: 21 TABLET | Refills: 0 | Status: SHIPPED | OUTPATIENT
Start: 2021-12-06 | End: 2021-12-15

## 2021-12-23 ENCOUNTER — IMMUNIZATION (OUTPATIENT)
Dept: PHARMACY | Facility: CLINIC | Age: 41
End: 2021-12-23
Payer: COMMERCIAL

## 2021-12-23 DIAGNOSIS — Z23 NEED FOR VACCINATION: Primary | ICD-10-CM

## 2022-01-17 RX ORDER — TRIAMCINOLONE ACETONIDE 40 MG/ML
40 INJECTION, SUSPENSION INTRA-ARTICULAR; INTRAMUSCULAR
Status: DISCONTINUED | OUTPATIENT
Start: 2022-01-17 | End: 2023-10-31

## 2022-02-08 NOTE — PROGRESS NOTES
LINKS immunization registry not responding  Care Everywhere updated  Health Maintenance updated  Chart reviewed for overdue Proactive [facility] ([facility]) health maintenance testing (CRS, Breast Ca, Diabetic Eye Exam)   Orders entered:N/A     No

## 2022-03-15 ENCOUNTER — PATIENT MESSAGE (OUTPATIENT)
Dept: DERMATOLOGY | Facility: CLINIC | Age: 42
End: 2022-03-15
Payer: COMMERCIAL

## 2022-03-16 ENCOUNTER — OFFICE VISIT (OUTPATIENT)
Dept: DERMATOLOGY | Facility: CLINIC | Age: 42
End: 2022-03-16
Payer: COMMERCIAL

## 2022-03-16 DIAGNOSIS — L91.0 KELOID: Primary | ICD-10-CM

## 2022-03-16 DIAGNOSIS — L29.9 ITCH: ICD-10-CM

## 2022-03-16 PROCEDURE — 99999 PR PBB SHADOW E&M-EST. PATIENT-LVL III: ICD-10-PCS | Mod: PBBFAC,,, | Performed by: DERMATOLOGY

## 2022-03-16 PROCEDURE — 99999 PR PBB SHADOW E&M-EST. PATIENT-LVL III: CPT | Mod: PBBFAC,,, | Performed by: DERMATOLOGY

## 2022-03-16 PROCEDURE — 99212 OFFICE O/P EST SF 10 MIN: CPT | Mod: 25,S$GLB,, | Performed by: DERMATOLOGY

## 2022-03-16 PROCEDURE — 11900 PR INJECTION INTO SKIN LESIONS, UP TO 7: ICD-10-PCS | Mod: S$GLB,,, | Performed by: DERMATOLOGY

## 2022-03-16 PROCEDURE — 11900 INJECT SKIN LESIONS </W 7: CPT | Mod: S$GLB,,, | Performed by: DERMATOLOGY

## 2022-03-16 PROCEDURE — 1159F PR MEDICATION LIST DOCUMENTED IN MEDICAL RECORD: ICD-10-PCS | Mod: CPTII,S$GLB,, | Performed by: DERMATOLOGY

## 2022-03-16 PROCEDURE — 1159F MED LIST DOCD IN RCRD: CPT | Mod: CPTII,S$GLB,, | Performed by: DERMATOLOGY

## 2022-03-16 PROCEDURE — 99212 PR OFFICE/OUTPT VISIT, EST, LEVL II, 10-19 MIN: ICD-10-PCS | Mod: 25,S$GLB,, | Performed by: DERMATOLOGY

## 2022-03-16 RX ORDER — CLOBETASOL PROPIONATE 0.5 MG/G
CREAM TOPICAL 2 TIMES DAILY
Qty: 45 G | Refills: 0 | Status: SHIPPED | OUTPATIENT
Start: 2022-03-16 | End: 2023-10-31

## 2022-03-16 NOTE — PATIENT INSTRUCTIONS

## 2022-03-16 NOTE — PROGRESS NOTES
Subjective:       Patient ID:  Urbano Paz is a 41 y.o. male who presents for   Chief Complaint   Patient presents with    Keloid     Chest      Keloid - Initial  Affected locations: chest  Duration: 9 years  Signs / symptoms: itching        Review of Systems   Constitutional: Negative.  Negative for fever and chills.   Respiratory: Negative for cough and shortness of breath.    Gastrointestinal: Negative for nausea and vomiting.   Musculoskeletal: Negative for joint swelling and arthralgias.   Skin: Negative for daily sunscreen use, activity-related sunscreen use, recent sunburn and wears hat.   All other systems reviewed and are negative.  Hematologic/Lymphatic: Does not bruise/bleed easily.        Objective:    Physical Exam   Constitutional: He appears well-developed and well-nourished.   Eyes: No conjunctival no injection.   Neurological: He is alert and oriented to person, place, and time. He is not disoriented.   Psychiatric: He has a normal mood and affect.   Skin:                 Diagram Legend     Erythematous scaling macule/papule c/w actinic keratosis       Vascular papule c/w angioma      Pigmented verrucoid papule/plaque c/w seborrheic keratosis      Yellow umbilicated papule c/w sebaceous hyperplasia      Irregularly shaped tan macule c/w lentigo     1-2 mm smooth white papules consistent with Milia      Movable subcutaneous cyst with punctum c/w epidermal inclusion cyst      Subcutaneous movable cyst c/w pilar cyst      Firm pink to brown papule c/w dermatofibroma      Pedunculated fleshy papule(s) c/w skin tag(s)      Evenly pigmented macule c/w junctional nevus     Mildly variegated pigmented, slightly irregular-bordered macule c/w mildly atypical nevus      Flesh colored to evenly pigmented papule c/w intradermal nevus       Pink pearly papule/plaque c/w basal cell carcinoma      Erythematous hyperkeratotic cursted plaque c/w SCC      Surgical scar with no sign of skin cancer  recurrence      Open and closed comedones      Inflammatory papules and pustules      Verrucoid papule consistent consistent with wart     Erythematous eczematous patches and plaques     Dystrophic onycholytic nail with subungual debris c/w onychomycosis     Umbilicated papule    Erythematous-base heme-crusted tan verrucoid plaque consistent with inflamed seborrheic keratosis     Erythematous Silvery Scaling Plaque c/w Psoriasis     See annotation      Assessment / Plan:        Keloid  -     clobetasoL (TEMOVATE) 0.05 % cream; Apply topically 2 (two) times daily. Focally on bumpy scar.Stop using steroid topical when skin is smooth and non itchy.  Do not treat dark or red coloring.  Dispense: 45 g; Refill: 0  Reviewed with patient different treatment options and associated risks.  Cryosurgery procedure note:    Verbal consent from the patient is obtained including, but not limited to, risk of hypopigmentation/hyperpigmentation, scar, recurrence of lesion. Liquid nitrogen cryosurgery is applied to 1 lesions to produce a freeze injury. The patient is aware that blisters may form and is instructed on wound care with gentle cleansing and use of vaseline ointment to keep moist until healed. The patient is supplied a handout on cryosurgery and is instructed to call if lesions do not completely resolve.  Intralesional Kenalog 40mg/cc (0.45 cc total) injected into 1 lesions on the chest today after obtaining verbal consent including risk of surrounding hypopigmentation. Patient tolerated procedure well.    Units:1  NDC for Kenalog 40mg/cc:  2854-7021-78    Patient warned of risks of denting or dimpling of the skin due to atrophy caused by steroid effects.          Itch  -     clobetasoL (TEMOVATE) 0.05 % cream; Apply topically 2 (two) times daily. Focally on bumpy scar.Stop using steroid topical when skin is smooth and non itchy.  Do not treat dark or red coloring.  Dispense: 45 g; Refill: 0  Reviewed with patient different  treatment options and associated risks.  Proper application of medications and or care for affected area(s) and condition(s) reviewed.  Discussed with patient the risks of topical and injectable steroids, including, but not limited, to atrophy, rosacea, acne, glaucoma, cataracts, adrenal suppression, striae.  Do not touch eyes with medication.  Stop using steroid topical when skin is smooth and non itchy.  Do not treat dark or red coloring.             Follow up in about 2 months (around 5/16/2022).

## 2022-05-10 ENCOUNTER — PATIENT OUTREACH (OUTPATIENT)
Dept: ADMINISTRATIVE | Facility: OTHER | Age: 42
End: 2022-05-10
Payer: COMMERCIAL

## 2022-05-18 ENCOUNTER — OFFICE VISIT (OUTPATIENT)
Dept: DERMATOLOGY | Facility: CLINIC | Age: 42
End: 2022-05-18
Payer: COMMERCIAL

## 2022-05-18 DIAGNOSIS — L91.0 KELOID: Primary | ICD-10-CM

## 2022-05-18 PROCEDURE — 1159F PR MEDICATION LIST DOCUMENTED IN MEDICAL RECORD: ICD-10-PCS | Mod: CPTII,S$GLB,, | Performed by: DERMATOLOGY

## 2022-05-18 PROCEDURE — 11900 PR INJECTION INTO SKIN LESIONS, UP TO 7: ICD-10-PCS | Mod: S$GLB,,, | Performed by: DERMATOLOGY

## 2022-05-18 PROCEDURE — 99499 UNLISTED E&M SERVICE: CPT | Mod: S$GLB,,, | Performed by: DERMATOLOGY

## 2022-05-18 PROCEDURE — 11900 INJECT SKIN LESIONS </W 7: CPT | Mod: S$GLB,,, | Performed by: DERMATOLOGY

## 2022-05-18 PROCEDURE — 1159F MED LIST DOCD IN RCRD: CPT | Mod: CPTII,S$GLB,, | Performed by: DERMATOLOGY

## 2022-05-18 PROCEDURE — 99499 NO LOS: ICD-10-PCS | Mod: S$GLB,,, | Performed by: DERMATOLOGY

## 2022-05-18 PROCEDURE — 99999 PR PBB SHADOW E&M-EST. PATIENT-LVL III: ICD-10-PCS | Mod: PBBFAC,,, | Performed by: DERMATOLOGY

## 2022-05-18 PROCEDURE — 99999 PR PBB SHADOW E&M-EST. PATIENT-LVL III: CPT | Mod: PBBFAC,,, | Performed by: DERMATOLOGY

## 2022-05-18 NOTE — PROGRESS NOTES
Subjective:       Patient ID:  Urbano Paz is a 41 y.o. male who presents for   Chief Complaint   Patient presents with    Keloid     Follow up     Keloid - Follow-up  Symptom course: improving  Affected locations: chest  Signs / symptoms: itching  Severity: mild        Review of Systems   Constitutional: Negative for fever and chills.   HENT: Negative for sore throat.    Respiratory: Negative for cough.         Objective:    Physical Exam   Constitutional: He appears well-developed and well-nourished.   Eyes: No conjunctival no injection.   Neurological: He is alert and oriented to person, place, and time.   Psychiatric: He has a normal mood and affect.   Skin:                 Diagram Legend     Erythematous scaling macule/papule c/w actinic keratosis       Vascular papule c/w angioma      Pigmented verrucoid papule/plaque c/w seborrheic keratosis      Yellow umbilicated papule c/w sebaceous hyperplasia      Irregularly shaped tan macule c/w lentigo     1-2 mm smooth white papules consistent with Milia      Movable subcutaneous cyst with punctum c/w epidermal inclusion cyst      Subcutaneous movable cyst c/w pilar cyst      Firm pink to brown papule c/w dermatofibroma      Pedunculated fleshy papule(s) c/w skin tag(s)      Evenly pigmented macule c/w junctional nevus     Mildly variegated pigmented, slightly irregular-bordered macule c/w mildly atypical nevus      Flesh colored to evenly pigmented papule c/w intradermal nevus       Pink pearly papule/plaque c/w basal cell carcinoma      Erythematous hyperkeratotic cursted plaque c/w SCC      Surgical scar with no sign of skin cancer recurrence      Open and closed comedones      Inflammatory papules and pustules      Verrucoid papule consistent consistent with wart     Erythematous eczematous patches and plaques     Dystrophic onycholytic nail with subungual debris c/w onychomycosis     Umbilicated papule    Erythematous-base heme-crusted tan  verrucoid plaque consistent with inflamed seborrheic keratosis     Erythematous Silvery Scaling Plaque c/w Psoriasis     See annotation      Assessment / Plan:        Keloid    Better.  Cont clob at home.  Hit and miss with pt usage.  Intralesional Kenalog 40mg/cc (0.34 cc total) injected into 1 lesions on the chest today after obtaining verbal consent including risk of surrounding hypopigmentation. Patient tolerated procedure well.    Units:1  NDC for Kenalog 40mg/cc:  3824-0539-20  Patient warned of risks of denting or dimpling of the skin due to atrophy caused by steroid effects.                     Follow up in about 2 months (around 7/18/2022).

## 2022-05-18 NOTE — PATIENT INSTRUCTIONS

## 2022-05-30 ENCOUNTER — TELEPHONE (OUTPATIENT)
Dept: CARDIOLOGY | Facility: CLINIC | Age: 42
End: 2022-05-30
Payer: COMMERCIAL

## 2022-05-30 NOTE — TELEPHONE ENCOUNTER
Laura BRITTON Staff  Caller: Unspecified (Today, 12:26 PM)  PT would like 9 am @ main [facility].  Please call PT to confirm.       Thanks   Left pt vmail confirming appt for 6/7/2022 at 9am

## 2022-05-30 NOTE — TELEPHONE ENCOUNTER
Pt wanted to confirm appt.   ----- Message from Jannie Patel MA sent at 5/30/2022  1:34 PM CDT -----  Contact: self  Pt is asking to speak to you. Pt is returning your call. Please call [phone].

## 2022-06-07 ENCOUNTER — OFFICE VISIT (OUTPATIENT)
Dept: CARDIOLOGY | Facility: CLINIC | Age: 42
End: 2022-06-07
Payer: COMMERCIAL

## 2022-06-07 VITALS
BODY MASS INDEX: 30.37 KG/M2 | WEIGHT: 200.38 LBS | HEART RATE: 58 BPM | OXYGEN SATURATION: 98 % | SYSTOLIC BLOOD PRESSURE: 118 MMHG | DIASTOLIC BLOOD PRESSURE: 82 MMHG | HEIGHT: 68 IN

## 2022-06-07 DIAGNOSIS — I83.811 VARICOSE VEINS OF RIGHT LOWER EXTREMITY WITH PAIN: ICD-10-CM

## 2022-06-07 DIAGNOSIS — I87.2 VENOUS INSUFFICIENCY: Primary | ICD-10-CM

## 2022-06-07 PROCEDURE — 99999 PR PBB SHADOW E&M-EST. PATIENT-LVL IV: ICD-10-PCS | Mod: PBBFAC,,, | Performed by: INTERNAL MEDICINE

## 2022-06-07 PROCEDURE — 3074F PR MOST RECENT SYSTOLIC BLOOD PRESSURE < 130 MM HG: ICD-10-PCS | Mod: CPTII,S$GLB,, | Performed by: INTERNAL MEDICINE

## 2022-06-07 PROCEDURE — 1159F PR MEDICATION LIST DOCUMENTED IN MEDICAL RECORD: ICD-10-PCS | Mod: CPTII,S$GLB,, | Performed by: INTERNAL MEDICINE

## 2022-06-07 PROCEDURE — 99205 OFFICE O/P NEW HI 60 MIN: CPT | Mod: S$GLB,,, | Performed by: INTERNAL MEDICINE

## 2022-06-07 PROCEDURE — 1159F MED LIST DOCD IN RCRD: CPT | Mod: CPTII,S$GLB,, | Performed by: INTERNAL MEDICINE

## 2022-06-07 PROCEDURE — 3079F DIAST BP 80-89 MM HG: CPT | Mod: CPTII,S$GLB,, | Performed by: INTERNAL MEDICINE

## 2022-06-07 PROCEDURE — 3008F PR BODY MASS INDEX (BMI) DOCUMENTED: ICD-10-PCS | Mod: CPTII,S$GLB,, | Performed by: INTERNAL MEDICINE

## 2022-06-07 PROCEDURE — 3079F PR MOST RECENT DIASTOLIC BLOOD PRESSURE 80-89 MM HG: ICD-10-PCS | Mod: CPTII,S$GLB,, | Performed by: INTERNAL MEDICINE

## 2022-06-07 PROCEDURE — 99999 PR PBB SHADOW E&M-EST. PATIENT-LVL IV: CPT | Mod: PBBFAC,,, | Performed by: INTERNAL MEDICINE

## 2022-06-07 PROCEDURE — 3074F SYST BP LT 130 MM HG: CPT | Mod: CPTII,S$GLB,, | Performed by: INTERNAL MEDICINE

## 2022-06-07 PROCEDURE — 99205 PR OFFICE/OUTPT VISIT, NEW, LEVL V, 60-74 MIN: ICD-10-PCS | Mod: S$GLB,,, | Performed by: INTERNAL MEDICINE

## 2022-06-07 PROCEDURE — 3008F BODY MASS INDEX DOCD: CPT | Mod: CPTII,S$GLB,, | Performed by: INTERNAL MEDICINE

## 2022-06-07 NOTE — PROGRESS NOTES
[facility]      Chief Complaint   Patient presents with    Varicose Veins     In right Leg       Patient ID: Urbano Paz is a 41 y.o. male with venous insufficiency, who presents for an initial appointment.  Pertinent history/events are as follows:     -Pt kindly referred by Dr. Jerome for evaluation of varicose veins of both lower extremities with pain    HPI:  Mr. Paz reports having varicose veins in the right leg for many years.  States he first noticed pain at the varicose veins in 2020.  Reports episodic pain and edema at the area of varicose veins in the right leg.  He has no claudication or tissue loss.  Has been evaluated by Vascular Surgery in the past, and is now interested in conservative management of the varicose veins.  BLE Venous Ultrasound on 5/23/2021 revealed hemodynamically significant reflux in both lower extremities with no evidence of DVT.     Past Medical History:   Diagnosis Date    Cerumen impaction      History reviewed. No pertinent surgical history.  Social History     Socioeconomic History    Marital status:     Number of children: 1   Occupational History    Occupation:      Comment: [employer]    Occupation:    Tobacco Use    Smoking status: Former Smoker    Smokeless tobacco: Never Used   Substance and Sexual Activity    Alcohol use: Yes     Alcohol/week: 2.0 standard drinks     Types: 2 Glasses of wine per week     Comment: socially    Drug use: No     History reviewed. No pertinent family history.    Review of patient's allergies indicates:  No Known Allergies    Medication List with Changes/Refills   Current Medications    CLOBETASOL (TEMOVATE) 0.05 % CREAM    Apply topically 2 (two) times daily. Focally on bumpy scar.Stop using steroid topical when skin is smooth and non itchy.  Do not treat dark or red coloring.    HYDROCORTISONE 1 % CREAM    Apply topically 2 (two) times daily.     "KETOCONAZOLE (NIZORAL) 2 % CREAM    Apply topically 2 (two) times daily.    KETOCONAZOLE 2 % FOAM    Apply 1 application topically 2 (two) times daily.    MULTIVITAMIN CAPSULE    Take 1 capsule by mouth once daily.   Discontinued Medications    FLUTICASONE PROPIONATE (FLONASE) 50 MCG/ACTUATION NASAL SPRAY    1 spray (50 mcg total) by Each Nostril route once daily.    VALACYCLOVIR (VALTREX) 1000 MG TABLET    Take 1 tablet (1,000 mg total) by mouth 2 (two) times daily.       Review of Systems  Constitution: Denies chills, fever, and sweats.  HENT: Denies headaches or blurry vision.  Cardiovascular: Denies chest pain or irregular heart beat.  Respiratory: Denies cough or shortness of breath.  Gastrointestinal: Denies abdominal pain, nausea, or vomiting.  Musculoskeletal: Positive for right leg pain and swelling at areas of varicose veins.  Neurological: Denies dizziness or focal weakness.  Psychiatric/Behavioral: Normal mental status.  Hematologic/Lymphatic: Denies bleeding problem or easy bruising/bleeding.  Skin: Denies rash or suspicious lesions    Physical Examination  /82   Pulse (!) 58   Ht 5' 8" (1.727 m)   Wt 90.9 kg (200 lb 6.4 oz)   SpO2 98%   BMI 30.47 kg/m²     Constitutional: No acute distress, conversant  HEENT: Sclera anicteric, Pupils equal, round and reactive to light, extraocular motions intact, Oropharynx clear  Neck: No JVD, no carotid bruits  Cardiovascular: regular rate and rhythm, no murmur, rubs or gallops, normal S1/S2  Pulmonary: Clear to auscultation bilaterally  Abdominal: Abdomen soft, nontender, nondistended, positive bowel sounds  Extremities: No lower extremity edema,   Pulses:  Carotid pulses are 2+ on the right side, and 2+ on the left side.  Radial pulses are 2+ on the right side, and 2+ on the left side.   Femoral pulses are 2+ on the right side, and 2+ on the left side.  Popliteal pulses are 2+ on the right side, and 2+ on the left side.   Dorsalis pedis pulses are 2+ on " the right side, and 2+ on the left side.   Posterior tibial pulses are 2+ on the right side, and 2+ on the left side.    Skin: No ecchymosis, erythema, or ulcers  Psych: Alert and oriented x 3, appropriate affect  Neuro: CNII-XII intact, no focal deficits    Labs:  Most Recent Data  CBC:   Lab Results   Component Value Date    WBC 4.59 07/29/2019    HGB 12.8 (L) 07/29/2019    HCT 39.6 (L) 07/29/2019     07/29/2019    MCV 87 07/29/2019    RDW 13.8 07/29/2019     BMP:   Lab Results   Component Value Date     07/29/2019    K 4.0 07/29/2019     07/29/2019    CO2 30 (H) 07/29/2019    BUN 16 07/29/2019    CREATININE 1.1 07/29/2019    GLU 95 07/29/2019    CALCIUM 9.4 07/29/2019     LFTS;   Lab Results   Component Value Date    PROT 7.1 07/29/2019    ALBUMIN 4.2 07/29/2019    BILITOT 0.2 07/29/2019    AST 23 07/29/2019    ALKPHOS 41 (L) 07/29/2019    ALT 48 (H) 07/29/2019     COAGS: No results found for: INR, PROTIME, PTT  FLP:   Lab Results   Component Value Date    CHOL 168 06/28/2018    HDL 55 06/28/2018    LDLCALC 96.8 06/28/2018    TRIG 81 06/28/2018    CHOLHDL 32.7 06/28/2018     CARDIAC:   Lab Results   Component Value Date    TROPONINI <0.006 03/06/2018    BNP 29 03/06/2018       Imaging:    BLE Venous Ultrasound 5/23/2021:  Right Leg: Duplex imaging of the right lower extremity veins demonstrates patent and compressible veins.   There is no evidence of a venous thrombosis in the deep or superficial veins.   Significant reflux noted in the right GSV including the Saphenofemoral Junction (SFJ) and the SSV including the Saphenopopliteal   Junction (SPJ). No reflux noted in the Accessory Vein. Incidental finding of significant reflux is noted in the CFV, Mid FV, and POP V.     Left Leg: Duplex imaging of the left lower extremity veins demonstrates patent and compressible veins.   There is no evidence of a venous thrombosis in the deep or superficial veins.   Significant reflux noted in the left GSV  including the Saphenofemoral Junction (SFJ). No reflux noted in the Accessory Vein and the   SSV. Incidental finding of significant reflux is noted in the CFV and the PTV  vein noted in the mid calf.     BLE Venous Ultrasound 5/14/2021:  No evidence of lower extremity DVT bilaterally.  Distal right GSV reflux noted (below knee).  This vessel appears to subtend medial calf and ankle varices which are painful to compression.  Consider dedicated venous insufficiency/reflux study for further evaluation.    Assessment/Plan:   Urbano Paz is a 41 y.o. male with venous insufficiency, who presents for an initial appointment.     1. BLE varicose veins with pain- Start waist high graduated compression hose.  Pt to limit sodium intake to 2,000 mg daily.  Limit volume intake to 1.5 liters daily.      Follow up in 2 months      Total duration of face to face visit time 45 minutes.  Total time spent counseling greater than fifty percent of total visit time.  Counseling included discussion regarding imaging findings, diagnosis, possibilities, treatment options, risks and benefits.  The patient had many questions regarding the options and long-term effects.    Sonu Mcguire MD, PhD  Interventional Cardiology

## 2022-06-07 NOTE — PATIENT INSTRUCTIONS
Assessment/Plan:   Urbano Paz is a 41 y.o. male with venous insufficiency, who presents for an initial appointment.     1. BLE varicose veins with pain- Start waist high graduated compression hose.  Pt to limit sodium intake to 2,000 mg daily.  Limit volume intake to 1.5 liters daily.      Follow up in 2 months

## 2022-06-11 ENCOUNTER — PATIENT MESSAGE (OUTPATIENT)
Dept: CARDIOLOGY | Facility: CLINIC | Age: 42
End: 2022-06-11
Payer: COMMERCIAL

## 2022-07-18 ENCOUNTER — PATIENT MESSAGE (OUTPATIENT)
Dept: DERMATOLOGY | Facility: CLINIC | Age: 42
End: 2022-07-18
Payer: COMMERCIAL

## 2022-07-19 ENCOUNTER — OFFICE VISIT (OUTPATIENT)
Dept: DERMATOLOGY | Facility: CLINIC | Age: 42
End: 2022-07-19
Payer: COMMERCIAL

## 2022-07-19 ENCOUNTER — TELEPHONE (OUTPATIENT)
Dept: DERMATOLOGY | Facility: CLINIC | Age: 42
End: 2022-07-19
Payer: COMMERCIAL

## 2022-07-19 DIAGNOSIS — D22.9 MULTIPLE BENIGN NEVI: ICD-10-CM

## 2022-07-19 DIAGNOSIS — L72.0 EPIDERMAL CYST: ICD-10-CM

## 2022-07-19 DIAGNOSIS — L91.0 KELOID: Primary | ICD-10-CM

## 2022-07-19 PROCEDURE — 99213 OFFICE O/P EST LOW 20 MIN: CPT | Mod: 25,S$GLB,, | Performed by: DERMATOLOGY

## 2022-07-19 PROCEDURE — 99999 PR PBB SHADOW E&M-EST. PATIENT-LVL III: ICD-10-PCS | Mod: PBBFAC,,, | Performed by: DERMATOLOGY

## 2022-07-19 PROCEDURE — 1159F MED LIST DOCD IN RCRD: CPT | Mod: CPTII,S$GLB,, | Performed by: DERMATOLOGY

## 2022-07-19 PROCEDURE — 11900 PR INJECTION INTO SKIN LESIONS, UP TO 7: ICD-10-PCS | Mod: S$GLB,,, | Performed by: DERMATOLOGY

## 2022-07-19 PROCEDURE — 1160F PR REVIEW ALL MEDS BY PRESCRIBER/CLIN PHARMACIST DOCUMENTED: ICD-10-PCS | Mod: CPTII,S$GLB,, | Performed by: DERMATOLOGY

## 2022-07-19 PROCEDURE — 11900 INJECT SKIN LESIONS </W 7: CPT | Mod: S$GLB,,, | Performed by: DERMATOLOGY

## 2022-07-19 PROCEDURE — 1160F RVW MEDS BY RX/DR IN RCRD: CPT | Mod: CPTII,S$GLB,, | Performed by: DERMATOLOGY

## 2022-07-19 PROCEDURE — 99213 PR OFFICE/OUTPT VISIT, EST, LEVL III, 20-29 MIN: ICD-10-PCS | Mod: 25,S$GLB,, | Performed by: DERMATOLOGY

## 2022-07-19 PROCEDURE — 1159F PR MEDICATION LIST DOCUMENTED IN MEDICAL RECORD: ICD-10-PCS | Mod: CPTII,S$GLB,, | Performed by: DERMATOLOGY

## 2022-07-19 PROCEDURE — 99999 PR PBB SHADOW E&M-EST. PATIENT-LVL III: CPT | Mod: PBBFAC,,, | Performed by: DERMATOLOGY

## 2022-07-19 NOTE — PROGRESS NOTES
Subjective:       Patient ID:  Urbano Paz is a 41 y.o. male who presents for   Chief Complaint   Patient presents with    Keloid     F/U    Lesion     BACK      Pt here today for keloid f/u. Prev tx with ILK at last OV    Patient with new complaint of lesion(s)  Location: back  Duration: greater than 1yr  Symptoms: itching. occ pain  Relieving factors/Previous treatments: none      Keloid    Lesion        Review of Systems   Constitutional: Negative for fever.   HENT: Negative for sore throat.    Skin: Positive for tendency to form keloidal scars. Negative for daily sunscreen use and activity-related sunscreen use.   Hematologic/Lymphatic: Does not bruise/bleed easily.        Objective:    Physical Exam   Constitutional: He appears well-developed and well-nourished. No distress.   Eyes: No conjunctival no injection.   Neurological: He is alert and oriented to person, place, and time. He is not disoriented.   Psychiatric: He has a normal mood and affect.   Skin:   Areas Examined (abnormalities noted in diagram):   Chest / Axilla Inspection Performed  Back Inspection Performed              Diagram Legend     Erythematous scaling macule/papule c/w actinic keratosis       Vascular papule c/w angioma      Pigmented verrucoid papule/plaque c/w seborrheic keratosis      Yellow umbilicated papule c/w sebaceous hyperplasia      Irregularly shaped tan macule c/w lentigo     1-2 mm smooth white papules consistent with Milia      Movable subcutaneous cyst with punctum c/w epidermal inclusion cyst      Subcutaneous movable cyst c/w pilar cyst      Firm pink to brown papule c/w dermatofibroma      Pedunculated fleshy papule(s) c/w skin tag(s)      Evenly pigmented macule c/w junctional nevus     Mildly variegated pigmented, slightly irregular-bordered macule c/w mildly atypical nevus      Flesh colored to evenly pigmented papule c/w intradermal nevus       Pink pearly papule/plaque c/w basal cell carcinoma       Erythematous hyperkeratotic cursted plaque c/w SCC      Surgical scar with no sign of skin cancer recurrence      Open and closed comedones      Inflammatory papules and pustules      Verrucoid papule consistent consistent with wart     Erythematous eczematous patches and plaques     Dystrophic onycholytic nail with subungual debris c/w onychomycosis     Umbilicated papule    Erythematous-base heme-crusted tan verrucoid plaque consistent with inflamed seborrheic keratosis     Erythematous Silvery Scaling Plaque c/w Psoriasis     See annotation      Assessment / Plan:        Keloid  Better.  Patient warned of risks of denting or dimpling of the skin due to atrophy caused by steroid effects.  Intralesional Kenalog 40mg/cc (0.25 cc total) injected into 1 lesions on the chest today after obtaining verbal consent including risk of surrounding hypopigmentation. Patient tolerated procedure well.  2 lateral ends of one keloid scar.  Center is already flat/slightly atrophic.    Units:1  NDC for Kenalog 40mg/cc:  8688-4557-43            Epidermal cyst  Discussed with patient the likelihood that this lesion is an epidermal cyst caused by an involuted lining of skin with dead skin trapped inside.  Cysts do not have a malignant potential but can become infected and turn into abscesses with possible cellulitis.  Discussed the option of warm compresses if infected with oral antibiotics.  Discussed the option of surgical excision with scar, possible recurrence, hematoma, and infection.  Patient to consider these options.  Discussed with patient the risks of procedure or surgery, including scar, ulceration, recurrence, skin discoloration, and infection.  Patient not to have alcohol, ibuprofen, nsaids day of and night before procedure.  No swimming pool or ocean water for one week after procedure.  No heavy exertion in general or physical activity that would stretch the surgical site for one week after the procedure.    Multiple  benign nevi  Back.             Follow up in about 2 months (around 9/19/2022).

## 2022-07-19 NOTE — TELEPHONE ENCOUNTER
Spoke with pt and he stated he'll arrive late to appt at 10:20am. Informed pt that is fine.    RD

## 2022-10-20 ENCOUNTER — PATIENT MESSAGE (OUTPATIENT)
Dept: DERMATOLOGY | Facility: CLINIC | Age: 42
End: 2022-10-20
Payer: COMMERCIAL

## 2022-11-21 ENCOUNTER — OFFICE VISIT (OUTPATIENT)
Dept: DERMATOLOGY | Facility: CLINIC | Age: 42
End: 2022-11-21
Payer: COMMERCIAL

## 2022-11-21 DIAGNOSIS — L91.0 KELOID: Primary | ICD-10-CM

## 2022-11-21 DIAGNOSIS — D22.9 BENIGN MOLE: ICD-10-CM

## 2022-11-21 DIAGNOSIS — L72.0 EPIDERMAL CYST: ICD-10-CM

## 2022-11-21 PROCEDURE — 11900 PR INJECTION INTO SKIN LESIONS, UP TO 7: ICD-10-PCS | Mod: S$GLB,,, | Performed by: DERMATOLOGY

## 2022-11-21 PROCEDURE — 1159F PR MEDICATION LIST DOCUMENTED IN MEDICAL RECORD: ICD-10-PCS | Mod: CPTII,S$GLB,, | Performed by: DERMATOLOGY

## 2022-11-21 PROCEDURE — 99999 PR PBB SHADOW E&M-EST. PATIENT-LVL III: CPT | Mod: PBBFAC,,, | Performed by: DERMATOLOGY

## 2022-11-21 PROCEDURE — 1159F MED LIST DOCD IN RCRD: CPT | Mod: CPTII,S$GLB,, | Performed by: DERMATOLOGY

## 2022-11-21 PROCEDURE — 1160F PR REVIEW ALL MEDS BY PRESCRIBER/CLIN PHARMACIST DOCUMENTED: ICD-10-PCS | Mod: CPTII,S$GLB,, | Performed by: DERMATOLOGY

## 2022-11-21 PROCEDURE — 11900 INJECT SKIN LESIONS </W 7: CPT | Mod: S$GLB,,, | Performed by: DERMATOLOGY

## 2022-11-21 PROCEDURE — 99999 PR PBB SHADOW E&M-EST. PATIENT-LVL III: ICD-10-PCS | Mod: PBBFAC,,, | Performed by: DERMATOLOGY

## 2022-11-21 PROCEDURE — 99212 OFFICE O/P EST SF 10 MIN: CPT | Mod: 25,S$GLB,, | Performed by: DERMATOLOGY

## 2022-11-21 PROCEDURE — 1160F RVW MEDS BY RX/DR IN RCRD: CPT | Mod: CPTII,S$GLB,, | Performed by: DERMATOLOGY

## 2022-11-21 PROCEDURE — 99212 PR OFFICE/OUTPT VISIT, EST, LEVL II, 10-19 MIN: ICD-10-PCS | Mod: 25,S$GLB,, | Performed by: DERMATOLOGY

## 2022-11-21 NOTE — PATIENT INSTRUCTIONS

## 2022-11-21 NOTE — PROGRESS NOTES
Subjective:       Patient ID:  Urbano Paz is a 42 y.o. male who presents for   Chief Complaint   Patient presents with    Keloid     Follow up      Keloid - Follow-up  Symptom course: improving  Affected locations: chest  Signs / symptoms: itching  Severity: mild    Review of Systems   Constitutional: Negative.    HENT: Negative.     Respiratory: Negative.        Objective:    Physical Exam   Constitutional: He appears well-developed and well-nourished.   Eyes: No conjunctival no injection.   Neurological: He is alert and oriented to person, place, and time.   Psychiatric: He has a normal mood and affect.   Skin:   Areas Examined (abnormalities noted in diagram):   Chest / Axilla Inspection Performed            Diagram Legend     Erythematous scaling macule/papule c/w actinic keratosis       Vascular papule c/w angioma      Pigmented verrucoid papule/plaque c/w seborrheic keratosis      Yellow umbilicated papule c/w sebaceous hyperplasia      Irregularly shaped tan macule c/w lentigo     1-2 mm smooth white papules consistent with Milia      Movable subcutaneous cyst with punctum c/w epidermal inclusion cyst      Subcutaneous movable cyst c/w pilar cyst      Firm pink to brown papule c/w dermatofibroma      Pedunculated fleshy papule(s) c/w skin tag(s)      Evenly pigmented macule c/w junctional nevus     Mildly variegated pigmented, slightly irregular-bordered macule c/w mildly atypical nevus      Flesh colored to evenly pigmented papule c/w intradermal nevus       Pink pearly papule/plaque c/w basal cell carcinoma      Erythematous hyperkeratotic cursted plaque c/w SCC      Surgical scar with no sign of skin cancer recurrence      Open and closed comedones      Inflammatory papules and pustules      Verrucoid papule consistent consistent with wart     Erythematous eczematous patches and plaques     Dystrophic onycholytic nail with subungual debris c/w onychomycosis     Umbilicated papule     Erythematous-base heme-crusted tan verrucoid plaque consistent with inflamed seborrheic keratosis     Erythematous Silvery Scaling Plaque c/w Psoriasis     See annotation      Assessment / Plan:        Keloid  Pt didn't keep 2 mth fu.  We are instead at 4 mth fu.  Keloid regrew some.  Reviewed with patient different treatment options and associated risks.  Cryosurgery procedure note:    Verbal consent from the patient is obtained including, but not limited to, risk of hypopigmentation/hyperpigmentation, scar, recurrence of lesion. Liquid nitrogen cryosurgery is applied to 1 lesions to produce a freeze injury. The patient is aware that blisters may form and is instructed on wound care with gentle cleansing and use of vaseline ointment to keep moist until healed. The patient is supplied a handout on cryosurgery and is instructed to call if lesions do not completely resolve.    Pt prefers light cryo due to blister formation.    Intralesional Kenalog 40mg/cc (0.35 cc total) injected into 1 lesions on the chest today after obtaining verbal consent including risk of surrounding hypopigmentation. Patient tolerated procedure well.    Units:1  NDC for Kenalog 40mg/cc:  8807-7584-54    Patient warned of risks of denting or dimpling of the skin due to atrophy caused by steroid effects.        Epidermal cyst  Resolved.  Patient to contact us for earlier follow up if anything recurs.    Benign mole  Discussed with patient the benign nature of these lesions and that no treatment is indicated.           Follow up in about 2 months (around 1/21/2023).

## 2023-01-12 ENCOUNTER — OFFICE VISIT (OUTPATIENT)
Dept: DERMATOLOGY | Facility: CLINIC | Age: 43
End: 2023-01-12
Payer: COMMERCIAL

## 2023-01-12 DIAGNOSIS — L73.9 FOLLICULITIS: ICD-10-CM

## 2023-01-12 DIAGNOSIS — L91.0 KELOID: Primary | ICD-10-CM

## 2023-01-12 DIAGNOSIS — Z76.89 ENCOUNTER FOR SKIN CARE: ICD-10-CM

## 2023-01-12 PROCEDURE — 1159F MED LIST DOCD IN RCRD: CPT | Mod: CPTII,S$GLB,, | Performed by: DERMATOLOGY

## 2023-01-12 PROCEDURE — 11900 INJECT SKIN LESIONS </W 7: CPT | Mod: S$GLB,,, | Performed by: DERMATOLOGY

## 2023-01-12 PROCEDURE — 99214 PR OFFICE/OUTPT VISIT, EST, LEVL IV, 30-39 MIN: ICD-10-PCS | Mod: 25,S$GLB,, | Performed by: DERMATOLOGY

## 2023-01-12 PROCEDURE — 1159F PR MEDICATION LIST DOCUMENTED IN MEDICAL RECORD: ICD-10-PCS | Mod: CPTII,S$GLB,, | Performed by: DERMATOLOGY

## 2023-01-12 PROCEDURE — 99999 PR PBB SHADOW E&M-EST. PATIENT-LVL III: CPT | Mod: PBBFAC,,, | Performed by: DERMATOLOGY

## 2023-01-12 PROCEDURE — 99214 OFFICE O/P EST MOD 30 MIN: CPT | Mod: 25,S$GLB,, | Performed by: DERMATOLOGY

## 2023-01-12 PROCEDURE — 99999 PR PBB SHADOW E&M-EST. PATIENT-LVL III: ICD-10-PCS | Mod: PBBFAC,,, | Performed by: DERMATOLOGY

## 2023-01-12 PROCEDURE — 1160F PR REVIEW ALL MEDS BY PRESCRIBER/CLIN PHARMACIST DOCUMENTED: ICD-10-PCS | Mod: CPTII,S$GLB,, | Performed by: DERMATOLOGY

## 2023-01-12 PROCEDURE — 11900 PR INJECTION INTO SKIN LESIONS, UP TO 7: ICD-10-PCS | Mod: S$GLB,,, | Performed by: DERMATOLOGY

## 2023-01-12 PROCEDURE — 1160F RVW MEDS BY RX/DR IN RCRD: CPT | Mod: CPTII,S$GLB,, | Performed by: DERMATOLOGY

## 2023-01-12 RX ORDER — CLINDAMYCIN PHOSPHATE 10 MG/G
GEL TOPICAL 2 TIMES DAILY
Qty: 60 G | Refills: 3 | Status: SHIPPED | OUTPATIENT
Start: 2023-01-12 | End: 2023-10-31

## 2023-01-12 NOTE — PROGRESS NOTES
Subjective:       Patient ID:  Urbano Paz is a 42 y.o. male who presents for   Chief Complaint   Patient presents with    Keloid     Follow up      Keloid - Follow-up  Symptom course: improving  Affected locations: chest  Signs / symptoms: asymptomatic    Review of Systems   Constitutional:  Negative for fever and chills.   HENT:  Negative for sore throat.    Respiratory:  Negative for cough.       Objective:    Physical Exam   Constitutional: He appears well-developed and well-nourished.   Eyes: No conjunctival no injection.   Neurological: He is alert and oriented to person, place, and time.   Psychiatric: He has a normal mood and affect.   Skin:   Areas Examined (abnormalities noted in diagram):   Chest / Axilla Inspection Performed            Diagram Legend     Erythematous scaling macule/papule c/w actinic keratosis       Vascular papule c/w angioma      Pigmented verrucoid papule/plaque c/w seborrheic keratosis      Yellow umbilicated papule c/w sebaceous hyperplasia      Irregularly shaped tan macule c/w lentigo     1-2 mm smooth white papules consistent with Milia      Movable subcutaneous cyst with punctum c/w epidermal inclusion cyst      Subcutaneous movable cyst c/w pilar cyst      Firm pink to brown papule c/w dermatofibroma      Pedunculated fleshy papule(s) c/w skin tag(s)      Evenly pigmented macule c/w junctional nevus     Mildly variegated pigmented, slightly irregular-bordered macule c/w mildly atypical nevus      Flesh colored to evenly pigmented papule c/w intradermal nevus       Pink pearly papule/plaque c/w basal cell carcinoma      Erythematous hyperkeratotic cursted plaque c/w SCC      Surgical scar with no sign of skin cancer recurrence      Open and closed comedones      Inflammatory papules and pustules      Verrucoid papule consistent consistent with wart     Erythematous eczematous patches and plaques     Dystrophic onycholytic nail with subungual debris c/w onychomycosis      Umbilicated papule    Erythematous-base heme-crusted tan verrucoid plaque consistent with inflamed seborrheic keratosis     Erythematous Silvery Scaling Plaque c/w Psoriasis     See annotation      Assessment / Plan:        Keloid  Getting flatter.  Cont clob cr bid focally.  Intralesional Kenalog 40mg/cc (0.28 cc total) injected into 2 ends of 1 lesions on the chest today after obtaining verbal consent including risk of surrounding hypopigmentation. Patient tolerated procedure well.    Units:1  NDC for Kenalog 40mg/cc:  5327-5662-55  Cryosurgery procedure note:    Verbal consent from the patient is obtained including, but not limited to, risk of hypopigmentation/hyperpigmentation, scar, recurrence of lesion. Liquid nitrogen cryosurgery is applied to 2 lesions to produce a freeze injury. The patient is aware that blisters may form and is instructed on wound care with gentle cleansing and use of vaseline ointment to keep moist until healed. The patient is supplied a handout on cryosurgery and is instructed to call if lesions do not completely resolve.    Patient warned of risks of denting or dimpling of the skin due to atrophy caused by steroid effects.    Watch smaller scar of the r lower chest.    Encounter for skin care  No hot water bathing reviewed.    Folliculitis  -     clindamycin phosphate 1% (CLEOCIN T) 1 % gel; Apply topically 2 (two) times daily. To breakouts chest  Dispense: 60 g; Refill: 3  Discussed with patient the etiology and pathogenesis of the disease or skin lesion(s) and possible treatments and aggravators.    Reviewed with patient different treatment options and associated risks.  Proper application of medications and or care for affected area(s) and condition(s) reviewed.  Patient to start using sulfur bar soap for the face or affected area 1-2 x day.  For scalp usage lather from the soap to be applied to the roots of the scalp and allow to sit for 3-5 minutes regularly.  Same instructions  for other affected areas.  This could cause more keloids!  We will recheck the chest at our follow up appointment.             Follow up in about 2 months (around 3/12/2023).

## 2023-07-13 ENCOUNTER — OFFICE VISIT (OUTPATIENT)
Dept: DERMATOLOGY | Facility: CLINIC | Age: 43
End: 2023-07-13
Payer: COMMERCIAL

## 2023-07-13 DIAGNOSIS — L91.0 KELOID: Primary | ICD-10-CM

## 2023-07-13 PROCEDURE — 99999 PR PBB SHADOW E&M-EST. PATIENT-LVL III: ICD-10-PCS | Mod: PBBFAC,,, | Performed by: DERMATOLOGY

## 2023-07-13 PROCEDURE — 99499 UNLISTED E&M SERVICE: CPT | Mod: S$GLB,,, | Performed by: DERMATOLOGY

## 2023-07-13 PROCEDURE — 99499 NO LOS: ICD-10-PCS | Mod: S$GLB,,, | Performed by: DERMATOLOGY

## 2023-07-13 PROCEDURE — 99999 PR PBB SHADOW E&M-EST. PATIENT-LVL III: CPT | Mod: PBBFAC,,, | Performed by: DERMATOLOGY

## 2023-07-13 PROCEDURE — 11900 PR INJECTION INTO SKIN LESIONS, UP TO 7: ICD-10-PCS | Mod: S$GLB,,, | Performed by: DERMATOLOGY

## 2023-07-13 PROCEDURE — 11900 INJECT SKIN LESIONS </W 7: CPT | Mod: S$GLB,,, | Performed by: DERMATOLOGY

## 2023-07-13 NOTE — PROGRESS NOTES
Subjective:      Patient ID:  Urbano Paz is a 42 y.o. male who presents for   Chief Complaint   Patient presents with    Keloid     Follow-up      Keloid - Follow-up  Symptom course: improving      Review of Systems   Constitutional: Negative.    HENT: Negative.     Respiratory: Negative.     Musculoskeletal: Negative.      Objective:   Physical Exam     Diagram Legend     Erythematous scaling macule/papule c/w actinic keratosis       Vascular papule c/w angioma      Pigmented verrucoid papule/plaque c/w seborrheic keratosis      Yellow umbilicated papule c/w sebaceous hyperplasia      Irregularly shaped tan macule c/w lentigo     1-2 mm smooth white papules consistent with Milia      Movable subcutaneous cyst with punctum c/w epidermal inclusion cyst      Subcutaneous movable cyst c/w pilar cyst      Firm pink to brown papule c/w dermatofibroma      Pedunculated fleshy papule(s) c/w skin tag(s)      Evenly pigmented macule c/w junctional nevus     Mildly variegated pigmented, slightly irregular-bordered macule c/w mildly atypical nevus      Flesh colored to evenly pigmented papule c/w intradermal nevus       Pink pearly papule/plaque c/w basal cell carcinoma      Erythematous hyperkeratotic cursted plaque c/w SCC      Surgical scar with no sign of skin cancer recurrence      Open and closed comedones      Inflammatory papules and pustules      Verrucoid papule consistent consistent with wart     Erythematous eczematous patches and plaques     Dystrophic onycholytic nail with subungual debris c/w onychomycosis     Umbilicated papule    Erythematous-base heme-crusted tan verrucoid plaque consistent with inflamed seborrheic keratosis     Erythematous Silvery Scaling Plaque c/w Psoriasis     See annotation        Assessment / Plan:        Keloid  Better.  Intralesional Kenalog 40mg/cc (0.5 cc total) injected into 1 long lesions on the chest today after obtaining verbal consent including risk of  surrounding hypopigmentation. Patient tolerated procedure well.    Units:1  NDC for Kenalog 40mg/cc:  3944-4788-12  Patient warned of risks of denting or dimpling of the skin due to atrophy caused by steroid effects.                   Follow up if symptoms worsen or fail to improve.

## 2023-10-31 ENCOUNTER — LAB VISIT (OUTPATIENT)
Dept: LAB | Facility: HOSPITAL | Age: 43
End: 2023-10-31
Attending: FAMILY MEDICINE
Payer: COMMERCIAL

## 2023-10-31 ENCOUNTER — OFFICE VISIT (OUTPATIENT)
Dept: INTERNAL MEDICINE | Facility: CLINIC | Age: 43
End: 2023-10-31
Attending: FAMILY MEDICINE
Payer: COMMERCIAL

## 2023-10-31 VITALS
HEIGHT: 68 IN | OXYGEN SATURATION: 97 % | SYSTOLIC BLOOD PRESSURE: 112 MMHG | BODY MASS INDEX: 29.57 KG/M2 | DIASTOLIC BLOOD PRESSURE: 70 MMHG | HEART RATE: 62 BPM | WEIGHT: 195.13 LBS

## 2023-10-31 DIAGNOSIS — I87.2 VENOUS INSUFFICIENCY: ICD-10-CM

## 2023-10-31 DIAGNOSIS — R79.89 ABNORMAL COMPLETE BLOOD COUNT: ICD-10-CM

## 2023-10-31 DIAGNOSIS — H93.A9 PULSATILE TINNITUS: ICD-10-CM

## 2023-10-31 DIAGNOSIS — Z00.00 ANNUAL PHYSICAL EXAM: Primary | ICD-10-CM

## 2023-10-31 DIAGNOSIS — Z00.00 ANNUAL PHYSICAL EXAM: ICD-10-CM

## 2023-10-31 DIAGNOSIS — H92.02 PAIN OF LEFT MASTOID: ICD-10-CM

## 2023-10-31 LAB
ALBUMIN SERPL BCP-MCNC: 4.5 G/DL (ref 3.5–5.2)
ALP SERPL-CCNC: 39 U/L (ref 55–135)
ALT SERPL W/O P-5'-P-CCNC: 12 U/L (ref 10–44)
ANION GAP SERPL CALC-SCNC: 13 MMOL/L (ref 8–16)
AST SERPL-CCNC: 15 U/L (ref 10–40)
BASOPHILS # BLD AUTO: 0.02 K/UL (ref 0–0.2)
BASOPHILS NFR BLD: 0.4 % (ref 0–1.9)
BILIRUB SERPL-MCNC: 0.4 MG/DL (ref 0.1–1)
BUN SERPL-MCNC: 17 MG/DL (ref 6–20)
CALCIUM SERPL-MCNC: 9.8 MG/DL (ref 8.7–10.5)
CHLORIDE SERPL-SCNC: 103 MMOL/L (ref 95–110)
CHOLEST SERPL-MCNC: 209 MG/DL (ref 120–199)
CHOLEST/HDLC SERPL: 3.3 {RATIO} (ref 2–5)
CO2 SERPL-SCNC: 24 MMOL/L (ref 23–29)
CREAT SERPL-MCNC: 0.9 MG/DL (ref 0.5–1.4)
DIFFERENTIAL METHOD: ABNORMAL
EOSINOPHIL # BLD AUTO: 0.1 K/UL (ref 0–0.5)
EOSINOPHIL NFR BLD: 1.1 % (ref 0–8)
ERYTHROCYTE [DISTWIDTH] IN BLOOD BY AUTOMATED COUNT: 13.8 % (ref 11.5–14.5)
EST. GFR  (NO RACE VARIABLE): >60 ML/MIN/1.73 M^2
ESTIMATED AVG GLUCOSE: 108 MG/DL (ref 68–131)
FERRITIN SERPL-MCNC: 102 NG/ML (ref 20–300)
GLUCOSE SERPL-MCNC: 86 MG/DL (ref 70–110)
HBA1C MFR BLD: 5.4 % (ref 4–5.6)
HCT VFR BLD AUTO: 42.2 % (ref 40–54)
HCV AB SERPL QL IA: NORMAL
HDLC SERPL-MCNC: 64 MG/DL (ref 40–75)
HDLC SERPL: 30.6 % (ref 20–50)
HGB BLD-MCNC: 13.6 G/DL (ref 14–18)
IMM GRANULOCYTES # BLD AUTO: 0.01 K/UL (ref 0–0.04)
IMM GRANULOCYTES NFR BLD AUTO: 0.2 % (ref 0–0.5)
IRON SERPL-MCNC: 100 UG/DL (ref 45–160)
LDLC SERPL CALC-MCNC: 129.2 MG/DL (ref 63–159)
LYMPHOCYTES # BLD AUTO: 1.5 K/UL (ref 1–4.8)
LYMPHOCYTES NFR BLD: 32.8 % (ref 18–48)
MCH RBC QN AUTO: 27.6 PG (ref 27–31)
MCHC RBC AUTO-ENTMCNC: 32.2 G/DL (ref 32–36)
MCV RBC AUTO: 86 FL (ref 82–98)
MONOCYTES # BLD AUTO: 0.4 K/UL (ref 0.3–1)
MONOCYTES NFR BLD: 8.4 % (ref 4–15)
NEUTROPHILS # BLD AUTO: 2.6 K/UL (ref 1.8–7.7)
NEUTROPHILS NFR BLD: 57.1 % (ref 38–73)
NONHDLC SERPL-MCNC: 145 MG/DL
NRBC BLD-RTO: 0 /100 WBC
PLATELET # BLD AUTO: 232 K/UL (ref 150–450)
PMV BLD AUTO: 12 FL (ref 9.2–12.9)
POTASSIUM SERPL-SCNC: 4.3 MMOL/L (ref 3.5–5.1)
PROT SERPL-MCNC: 7.5 G/DL (ref 6–8.4)
RBC # BLD AUTO: 4.93 M/UL (ref 4.6–6.2)
SODIUM SERPL-SCNC: 140 MMOL/L (ref 136–145)
TRIGL SERPL-MCNC: 79 MG/DL (ref 30–150)
WBC # BLD AUTO: 4.51 K/UL (ref 3.9–12.7)

## 2023-10-31 PROCEDURE — 86803 HEPATITIS C AB TEST: CPT | Performed by: FAMILY MEDICINE

## 2023-10-31 PROCEDURE — 3078F PR MOST RECENT DIASTOLIC BLOOD PRESSURE < 80 MM HG: ICD-10-PCS | Mod: CPTII,S$GLB,, | Performed by: FAMILY MEDICINE

## 2023-10-31 PROCEDURE — 3078F DIAST BP <80 MM HG: CPT | Mod: CPTII,S$GLB,, | Performed by: FAMILY MEDICINE

## 2023-10-31 PROCEDURE — 81269 HBA1/HBA2 GENE DUP/DEL VRNTS: CPT | Performed by: FAMILY MEDICINE

## 2023-10-31 PROCEDURE — 3074F SYST BP LT 130 MM HG: CPT | Mod: CPTII,S$GLB,, | Performed by: FAMILY MEDICINE

## 2023-10-31 PROCEDURE — 83540 ASSAY OF IRON: CPT | Performed by: FAMILY MEDICINE

## 2023-10-31 PROCEDURE — 85025 COMPLETE CBC W/AUTO DIFF WBC: CPT | Performed by: FAMILY MEDICINE

## 2023-10-31 PROCEDURE — 1159F MED LIST DOCD IN RCRD: CPT | Mod: CPTII,S$GLB,, | Performed by: FAMILY MEDICINE

## 2023-10-31 PROCEDURE — 82728 ASSAY OF FERRITIN: CPT | Performed by: FAMILY MEDICINE

## 2023-10-31 PROCEDURE — 3008F BODY MASS INDEX DOCD: CPT | Mod: CPTII,S$GLB,, | Performed by: FAMILY MEDICINE

## 2023-10-31 PROCEDURE — 99396 PR PREVENTIVE VISIT,EST,40-64: ICD-10-PCS | Mod: S$GLB,,, | Performed by: FAMILY MEDICINE

## 2023-10-31 PROCEDURE — 99396 PREV VISIT EST AGE 40-64: CPT | Mod: S$GLB,,, | Performed by: FAMILY MEDICINE

## 2023-10-31 PROCEDURE — 3074F PR MOST RECENT SYSTOLIC BLOOD PRESSURE < 130 MM HG: ICD-10-PCS | Mod: CPTII,S$GLB,, | Performed by: FAMILY MEDICINE

## 2023-10-31 PROCEDURE — 99999 PR PBB SHADOW E&M-EST. PATIENT-LVL IV: CPT | Mod: PBBFAC,,, | Performed by: FAMILY MEDICINE

## 2023-10-31 PROCEDURE — 3008F PR BODY MASS INDEX (BMI) DOCUMENTED: ICD-10-PCS | Mod: CPTII,S$GLB,, | Performed by: FAMILY MEDICINE

## 2023-10-31 PROCEDURE — 80053 COMPREHEN METABOLIC PANEL: CPT | Performed by: FAMILY MEDICINE

## 2023-10-31 PROCEDURE — 99999 PR PBB SHADOW E&M-EST. PATIENT-LVL IV: ICD-10-PCS | Mod: PBBFAC,,, | Performed by: FAMILY MEDICINE

## 2023-10-31 PROCEDURE — 80061 LIPID PANEL: CPT | Performed by: FAMILY MEDICINE

## 2023-10-31 PROCEDURE — 1160F PR REVIEW ALL MEDS BY PRESCRIBER/CLIN PHARMACIST DOCUMENTED: ICD-10-PCS | Mod: CPTII,S$GLB,, | Performed by: FAMILY MEDICINE

## 2023-10-31 PROCEDURE — 83036 HEMOGLOBIN GLYCOSYLATED A1C: CPT | Performed by: FAMILY MEDICINE

## 2023-10-31 PROCEDURE — 1159F PR MEDICATION LIST DOCUMENTED IN MEDICAL RECORD: ICD-10-PCS | Mod: CPTII,S$GLB,, | Performed by: FAMILY MEDICINE

## 2023-10-31 PROCEDURE — 1160F RVW MEDS BY RX/DR IN RCRD: CPT | Mod: CPTII,S$GLB,, | Performed by: FAMILY MEDICINE

## 2023-10-31 NOTE — ASSESSMENT & PLAN NOTE
-no mass noted on exam, focal small area of subj tenderness superficially  -had CT for tinnitus in 2021 (not specific to this location) does not want to get another CT  -US ??  -pat would like to monitor for now, an ENT consult order will be placed in case not resolve in 2-3 weeks

## 2023-10-31 NOTE — PROGRESS NOTES
Subjective:       Patient ID: Urbano Paz is a 43 y.o. male.    Chief Complaint: Annual Exam and pain behind ear    Established patient for an annual wellness check/physical exam and also chronic disease management. Specific complaints - see dictation, M*model entries and please see ROS.  Past, Surgical, Family, Social Histories; Medications, Allergies reviewed and reconciled.  Health maintenance file reviewed and addressed items due. Recent applicable lab, imaging and cardiovascular results reviewed.  Problem list items reviewed and modified or added entries (in the overview section) may not be transcribed into this encounter note due to note writer format.      Pain to L mastoid, focal, about a month. No other sx such as URI, etc.        Review of Systems   Constitutional:  Negative for appetite change, chills, diaphoresis, fatigue and fever.   HENT:  Negative for congestion, postnasal drip, rhinorrhea, sore throat and trouble swallowing.    Eyes:  Negative for visual disturbance.   Respiratory:  Negative for cough, choking, chest tightness, shortness of breath and wheezing.    Cardiovascular:  Negative for chest pain and leg swelling.   Gastrointestinal:  Negative for abdominal distention, abdominal pain, diarrhea, nausea and vomiting.   Genitourinary:  Negative for difficulty urinating and hematuria.   Musculoskeletal:  Negative for arthralgias and myalgias.   Skin:  Negative for rash.   Neurological:  Negative for weakness, light-headedness and headaches.   Psychiatric/Behavioral:  Negative for confusion and dysphoric mood.        Objective:      Physical Exam  Vitals and nursing note reviewed.   Constitutional:       General: He is not in acute distress.     Appearance: He is well-developed. He is not diaphoretic.   HENT:      Head: Normocephalic.      Right Ear: Tympanic membrane, ear canal and external ear normal.      Left Ear: Tympanic membrane, ear canal and external ear normal.      Nose: Nose  normal.      Mouth/Throat:      Pharynx: Oropharynx is clear. Uvula midline. No oropharyngeal exudate.   Eyes:      General: No scleral icterus.        Right eye: No discharge.         Left eye: No discharge.      Conjunctiva/sclera: Conjunctivae normal.   Neck:      Thyroid: No thyromegaly.      Vascular: No carotid bruit or JVD.      Trachea: No tracheal deviation.   Cardiovascular:      Rate and Rhythm: Normal rate and regular rhythm.      Heart sounds: Normal heart sounds. No murmur heard.     No friction rub. No gallop.   Pulmonary:      Effort: Pulmonary effort is normal. No respiratory distress.      Breath sounds: Normal breath sounds. No wheezing or rales.   Chest:      Chest wall: No tenderness.   Abdominal:      General: There is no distension.      Palpations: Abdomen is soft. There is no mass.      Tenderness: There is no abdominal tenderness. There is no guarding or rebound.   Musculoskeletal:      Cervical back: Normal range of motion and neck supple.   Lymphadenopathy:      Cervical: No cervical adenopathy.   Skin:     General: Skin is warm and dry.      Findings: No erythema or rash.   Neurological:      Mental Status: He is alert and oriented to person, place, and time.      Cranial Nerves: No cranial nerve deficit.      Motor: No tremor.      Coordination: Coordination normal.      Gait: Gait normal.   Psychiatric:         Behavior: Behavior normal.         Thought Content: Thought content normal.         Judgment: Judgment normal.         Assessment:       1. Annual physical exam    2. Pain of left mastoid    3. Pulsatile tinnitus    4. Venous insufficiency    5. Abnormal complete blood count        Plan:     Medication List with Changes/Refills   Current Medications    KETOCONAZOLE (NIZORAL) 2 % CREAM    Apply topically 2 (two) times daily.    KETOCONAZOLE 2 % FOAM    Apply 1 application topically 2 (two) times daily.    MULTIVITAMIN CAPSULE    Take 1 capsule by mouth once daily.   Discontinued  Medications    CLINDAMYCIN PHOSPHATE 1% (CLEOCIN T) 1 % GEL    Apply topically 2 (two) times daily. To breakouts on chest    CLOBETASOL (TEMOVATE) 0.05 % CREAM    Apply topically 2 (two) times daily. Focally on bumpy scar.Stop using steroid topical when skin is smooth and non itchy.  Do not treat dark or red coloring.    HYDROCORTISONE 1 % CREAM    Apply topically 2 (two) times daily.     1. Annual physical exam  -     Comprehensive Metabolic Panel; Future; Expected date: 10/31/2023  -     Lipid Panel; Future; Expected date: 10/31/2023  -     Hemoglobin A1C; Future; Expected date: 10/31/2023  -     Hepatitis C Antibody; Future; Expected date: 10/31/2023  -     Alpha-Globin Gene Analysis; Future; Expected date: 10/31/2023  -     Iron; Future; Expected date: 10/31/2023  -     Ferritin; Future; Expected date: 10/31/2023  -     CBC Auto Differential; Future; Expected date: 10/31/2023    2. Pain of left mastoid  Assessment & Plan:  -no mass noted on exam, focal small area of subj tenderness superficially  -had CT for tinnitus in 2021 (not specific to this location) does not want to get another CT  -US ??  -pat would like to monitor for now, an ENT consult order will be placed in case not resolve in 2-3 weeks    Orders:  -     US Soft Tissue Head Neck Thyroid; Future; Expected date: 10/31/2023  -     Ambulatory referral/consult to ENT; Future; Expected date: 11/07/2023    3. Pulsatile tinnitus  Overview:  -ENT eval 5/25/2021, neg CTA 5/31/2021      4. Venous insufficiency  Overview:  -US' 5/14/2021, 5/23/2021 - reflux  -CV and card evaluations 5/24/2021, 6/7/2022  -uses compression intermittently      5. Abnormal complete blood count  -     Alpha-Globin Gene Analysis; Future; Expected date: 10/31/2023  -     Iron; Future; Expected date: 10/31/2023  -     Ferritin; Future; Expected date: 10/31/2023  -     CBC Auto Differential; Future; Expected date: 10/31/2023      See meds, orders, follow up, routing and instructions  sections of encounter and AVS. Discussed with patient and provided on AVS.    Discussed diet and exercise as therapeutic modalities for metabolic and other conditions. Provided patient information, which are included as links on the AVS for detailed information.    Lab Results   Component Value Date     07/29/2019    K 4.0 07/29/2019     07/29/2019    BUN 16 07/29/2019    CREATININE 1.1 07/29/2019    GLU 95 07/29/2019    AST 23 07/29/2019    ALT 48 (H) 07/29/2019    ALBUMIN 4.2 07/29/2019    PROT 7.1 07/29/2019    BILITOT 0.2 07/29/2019    CHOL 168 06/28/2018    HDL 55 06/28/2018    LDLCALC 96.8 06/28/2018    TRIG 81 06/28/2018    WBC 4.59 07/29/2019    HGB 12.8 (L) 07/29/2019    HCT 39.6 (L) 07/29/2019     07/29/2019    BNP 29 03/06/2018

## 2023-11-10 LAB
ALPHA GLOBIN RELEASED BY: NORMAL
ALPHA-GLOBIN SPECIMEN: NORMAL
GENETICIST REVIEW: NORMAL
HBA1 GENE MUT ANL BLD/T: NEGATIVE
HBA1 GENE MUT ANL BLD/T: NORMAL
REF LAB TEST METHOD: NORMAL
SERVICE CMNT-IMP: NORMAL
SPECIMEN SOURCE: NORMAL

## 2024-05-17 DIAGNOSIS — L29.9 ITCH: ICD-10-CM

## 2024-05-17 DIAGNOSIS — L91.0 KELOID: ICD-10-CM

## 2024-05-21 RX ORDER — CLOBETASOL PROPIONATE 0.5 MG/G
CREAM TOPICAL 2 TIMES DAILY
Qty: 45 G | Refills: 0 | OUTPATIENT
Start: 2024-05-21

## 2024-06-10 ENCOUNTER — PATIENT MESSAGE (OUTPATIENT)
Dept: INTERNAL MEDICINE | Facility: CLINIC | Age: 44
End: 2024-06-10
Payer: COMMERCIAL

## 2024-10-31 ENCOUNTER — PATIENT MESSAGE (OUTPATIENT)
Dept: INTERNAL MEDICINE | Facility: CLINIC | Age: 44
End: 2024-10-31
Payer: COMMERCIAL

## 2024-11-09 ENCOUNTER — PATIENT MESSAGE (OUTPATIENT)
Dept: INTERNAL MEDICINE | Facility: CLINIC | Age: 44
End: 2024-11-09
Payer: COMMERCIAL

## 2024-11-11 ENCOUNTER — PATIENT MESSAGE (OUTPATIENT)
Dept: INTERNAL MEDICINE | Facility: CLINIC | Age: 44
End: 2024-11-11
Payer: COMMERCIAL

## 2024-11-15 ENCOUNTER — TELEPHONE (OUTPATIENT)
Dept: INTERNAL MEDICINE | Facility: CLINIC | Age: 44
End: 2024-11-15
Payer: COMMERCIAL

## 2024-11-15 DIAGNOSIS — S09.92XA INJURY OF NOSE, INITIAL ENCOUNTER: Primary | ICD-10-CM

## 2024-11-15 NOTE — TELEPHONE ENCOUNTER
Patient no-show today, unfortunately at last visit left before being seen as clinic was very late.    Chart reviewed today, reported nasal injury.  We can check x-rays and ENT consult.  Both orders placed.  Please call patient and see if he wants to pursue x-rays, or see ENT if still having trouble.    Thank you

## 2024-11-18 ENCOUNTER — HOSPITAL ENCOUNTER (OUTPATIENT)
Dept: RADIOLOGY | Facility: OTHER | Age: 44
Discharge: HOME | End: 2024-11-18
Attending: FAMILY MEDICINE
Payer: COMMERCIAL

## 2024-11-18 DIAGNOSIS — S09.92XA INJURY OF NOSE, INITIAL ENCOUNTER: ICD-10-CM

## 2024-11-18 PROCEDURE — 70160 X-RAY EXAM OF NASAL BONES: CPT | Mod: TC,FY

## 2024-11-18 PROCEDURE — 70160 X-RAY EXAM OF NASAL BONES: CPT | Mod: 26,,, | Performed by: RADIOLOGY

## 2024-11-19 DIAGNOSIS — L21.9 SEBORRHEIC DERMATITIS: ICD-10-CM

## 2024-11-19 RX ORDER — KETOCONAZOLE 2 G/100G
1 AEROSOL, FOAM TOPICAL 2 TIMES DAILY
Qty: 100 G | Refills: 11 | Status: SHIPPED | OUTPATIENT
Start: 2024-11-19

## 2025-06-01 ENCOUNTER — PATIENT MESSAGE (OUTPATIENT)
Dept: INTERNAL MEDICINE | Facility: CLINIC | Age: 45
End: 2025-06-01
Payer: COMMERCIAL

## 2025-06-11 ENCOUNTER — TELEPHONE (OUTPATIENT)
Dept: INTERNAL MEDICINE | Facility: CLINIC | Age: 45
End: 2025-06-11
Payer: COMMERCIAL

## 2025-06-12 ENCOUNTER — OFFICE VISIT (OUTPATIENT)
Dept: INTERNAL MEDICINE | Facility: CLINIC | Age: 45
End: 2025-06-12
Attending: FAMILY MEDICINE
Payer: COMMERCIAL

## 2025-06-12 VITALS
OXYGEN SATURATION: 98 % | DIASTOLIC BLOOD PRESSURE: 74 MMHG | BODY MASS INDEX: 30.07 KG/M2 | HEART RATE: 64 BPM | HEIGHT: 68 IN | WEIGHT: 198.44 LBS | SYSTOLIC BLOOD PRESSURE: 100 MMHG

## 2025-06-12 DIAGNOSIS — Z77.098: ICD-10-CM

## 2025-06-12 DIAGNOSIS — H93.A9 PULSATILE TINNITUS: ICD-10-CM

## 2025-06-12 DIAGNOSIS — Z00.00 ANNUAL PHYSICAL EXAM: Primary | ICD-10-CM

## 2025-06-12 PROBLEM — M22.2X1 PATELLOFEMORAL PAIN SYNDROME OF RIGHT KNEE: Status: RESOLVED | Noted: 2018-06-28 | Resolved: 2025-06-12

## 2025-06-12 PROCEDURE — 3074F SYST BP LT 130 MM HG: CPT | Mod: CPTII,S$GLB,, | Performed by: FAMILY MEDICINE

## 2025-06-12 PROCEDURE — 1159F MED LIST DOCD IN RCRD: CPT | Mod: CPTII,S$GLB,, | Performed by: FAMILY MEDICINE

## 2025-06-12 PROCEDURE — 3008F BODY MASS INDEX DOCD: CPT | Mod: CPTII,S$GLB,, | Performed by: FAMILY MEDICINE

## 2025-06-12 PROCEDURE — 99396 PREV VISIT EST AGE 40-64: CPT | Mod: S$GLB,,, | Performed by: FAMILY MEDICINE

## 2025-06-12 PROCEDURE — 1160F RVW MEDS BY RX/DR IN RCRD: CPT | Mod: CPTII,S$GLB,, | Performed by: FAMILY MEDICINE

## 2025-06-12 PROCEDURE — 3078F DIAST BP <80 MM HG: CPT | Mod: CPTII,S$GLB,, | Performed by: FAMILY MEDICINE

## 2025-06-12 PROCEDURE — 99999 PR PBB SHADOW E&M-EST. PATIENT-LVL IV: CPT | Mod: PBBFAC,,, | Performed by: FAMILY MEDICINE

## 2025-06-12 NOTE — PROGRESS NOTES
Subjective:       Patient ID: Urbano Paz is a 44 y.o. male.    Chief Complaint: Annual Exam    Established patient for an annual wellness check/physical exam and also chronic disease management. Specific complaints - see dictation, M*model entries and please see ROS.  Past, Surgical, Family, Social Histories; Medications, Allergies reviewed and reconciled.  Health maintenance file reviewed and addressed items due. Recent applicable lab, imaging and cardiovascular results reviewed.  Problem list items reviewed and modified or added entries (in the overview section) may not be transcribed into this encounter note due to note writer format.      Recent respiratory illness, scratchy throat and head congestion. Foggy and exhaustion. Fatigue 2 weeks, sx for a month, has resolved.    [facility] [employer] ([employer], [facility])    Karate, frequent exercise.        Review of Systems   Constitutional:  Negative for appetite change, chills, diaphoresis, fatigue and fever.   HENT:  Negative for congestion, postnasal drip, rhinorrhea, sore throat and trouble swallowing.         All URI sx resolved   Eyes:  Negative for visual disturbance.   Respiratory:  Negative for cough, choking, chest tightness, shortness of breath and wheezing.    Cardiovascular:  Negative for chest pain and leg swelling.   Gastrointestinal:  Negative for abdominal distention, abdominal pain, diarrhea, nausea and vomiting.   Genitourinary:  Negative for difficulty urinating and hematuria.   Musculoskeletal:  Negative for arthralgias and myalgias.   Skin:  Negative for rash.   Neurological:  Negative for weakness, light-headedness and headaches.   Psychiatric/Behavioral:  Negative for confusion and dysphoric mood.        Objective:      Physical Exam  Vitals and nursing note reviewed.   Constitutional:       Appearance: He is well-developed. He is not diaphoretic.   Eyes:      General: No scleral icterus.  Neck:       Thyroid: No thyromegaly.      Vascular: No JVD.      Trachea: No tracheal deviation.   Cardiovascular:      Rate and Rhythm: Normal rate and regular rhythm.      Heart sounds: Normal heart sounds. No murmur heard.     No friction rub. No gallop.   Pulmonary:      Effort: Pulmonary effort is normal. No respiratory distress.      Breath sounds: Normal breath sounds. No wheezing or rales.   Abdominal:      General: There is no distension.      Palpations: Abdomen is soft. There is no mass.      Tenderness: There is no abdominal tenderness. There is no guarding or rebound.   Musculoskeletal:      Cervical back: Normal range of motion and neck supple.   Lymphadenopathy:      Cervical: No cervical adenopathy.   Skin:     General: Skin is warm and dry.      Findings: No erythema or rash.   Neurological:      Mental Status: He is alert and oriented to person, place, and time.      Cranial Nerves: No cranial nerve deficit.      Motor: No tremor.      Coordination: Coordination normal.      Gait: Gait normal.   Psychiatric:         Behavior: Behavior normal.         Thought Content: Thought content normal.         Judgment: Judgment normal.         Assessment:       1. Annual physical exam    2. Pulsatile tinnitus    3. Exposure to formaldehyde        Plan:     Medication List with Changes/Refills   Current Medications    KETOCONAZOLE (NIZORAL) 2 % CREAM    Apply topically 2 (two) times daily.    KETOCONAZOLE 2 % FOAM    Apply 1 application topically 2 (two) times daily.    MULTIVITAMIN CAPSULE    Take 1 capsule by mouth once daily.     1. Annual physical exam  -     Comprehensive Metabolic Panel; Future; Expected date: 06/12/2025  -     Lipid Panel; Future; Expected date: 06/12/2025  -     CBC Auto Differential; Future; Expected date: 06/12/2025    2. Pulsatile tinnitus  Overview:  -ENT eval 5/25/2021, neg CTA 5/31/2021    Assessment & Plan:  -no ongoing significant symptoms, only salty foods      3. Exposure to  formaldehyde  -     Ambulatory referral/consult to Occupational Medicine; Future; Expected date: 06/19/2025      See meds, orders, follow up, routing and instructions sections of encounter and AVS. Discussed with patient and provided on AVS.    Discussed diet and exercise as therapeutic modalities for metabolic and other conditions. Provided patient information, which are included as links on the AVS for detailed information.    Lab Results   Component Value Date     10/31/2023    K 4.3 10/31/2023     10/31/2023    BUN 17 10/31/2023    CREATININE 0.9 10/31/2023    GLU 86 10/31/2023    HGBA1C 5.4 10/31/2023    AST 15 10/31/2023    ALT 12 10/31/2023    ALBUMIN 4.5 10/31/2023    PROT 7.5 10/31/2023    BILITOT 0.4 10/31/2023    CHOL 209 (H) 10/31/2023    HDL 64 10/31/2023    LDLCALC 129.2 10/31/2023    TRIG 79 10/31/2023    WBC 4.51 10/31/2023    HGB 13.6 (L) 10/31/2023    HCT 42.2 10/31/2023     10/31/2023    BNP 29 03/06/2018

## 2025-06-13 ENCOUNTER — TELEPHONE (OUTPATIENT)
Dept: URGENT CARE | Facility: CLINIC | Age: 45
End: 2025-06-13
Payer: COMMERCIAL

## 2025-06-19 ENCOUNTER — TELEPHONE (OUTPATIENT)
Dept: URGENT CARE | Facility: CLINIC | Age: 45
End: 2025-06-19
Payer: COMMERCIAL

## 2025-06-20 ENCOUNTER — TELEPHONE (OUTPATIENT)
Dept: URGENT CARE | Facility: CLINIC | Age: 45
End: 2025-06-20
Payer: COMMERCIAL